# Patient Record
Sex: FEMALE | Race: WHITE | Employment: FULL TIME | URBAN - METROPOLITAN AREA
[De-identification: names, ages, dates, MRNs, and addresses within clinical notes are randomized per-mention and may not be internally consistent; named-entity substitution may affect disease eponyms.]

---

## 2017-06-10 PROBLEM — R71.8 MICROCYTOSIS: Status: ACTIVE | Noted: 2017-06-10

## 2017-06-10 PROBLEM — E87.1 HYPONATREMIA: Status: ACTIVE | Noted: 2017-06-10

## 2017-06-10 PROBLEM — I26.99 PULMONARY EMBOLI (HCC): Status: ACTIVE | Noted: 2017-06-10

## 2017-06-10 PROBLEM — F32.A DEPRESSION: Status: ACTIVE | Noted: 2017-06-10

## 2017-06-16 PROBLEM — Z79.01 LONG TERM CURRENT USE OF ANTICOAGULANT: Status: ACTIVE | Noted: 2017-06-16

## 2017-06-16 PROBLEM — I82.412 ACUTE DEEP VEIN THROMBOSIS (DVT) OF FEMORAL VEIN OF LEFT LOWER EXTREMITY (HCC): Status: ACTIVE | Noted: 2017-06-16

## 2017-06-26 PROBLEM — H53.121 TRANSIENT BLINDNESS OF RIGHT EYE: Status: ACTIVE | Noted: 2017-06-26

## 2017-06-26 PROBLEM — D64.9 ANEMIA: Status: ACTIVE | Noted: 2017-06-26

## 2017-06-27 PROBLEM — R00.1 BRADYCARDIA: Status: ACTIVE | Noted: 2017-06-27

## 2021-09-07 RX ORDER — VENLAFAXINE 100 MG/1
200 TABLET ORAL DAILY
Qty: 180 TABLET | Refills: 0 | Status: SHIPPED | OUTPATIENT
Start: 2021-09-07 | End: 2021-10-28 | Stop reason: SDUPTHER

## 2021-09-07 NOTE — TELEPHONE ENCOUNTER
Sent Effexor refill for 90 days, will need appointment with me to re-establish care before she rans out

## 2021-09-07 NOTE — TELEPHONE ENCOUNTER
Rx refill request for   Venlafaxine 100MG Tab  #90  Sig: Take 2 Tab by mouth in the morning and 1 at night    Pharmacy: [id number] [facility]

## 2021-09-29 ENCOUNTER — TELEPHONE (OUTPATIENT)
Dept: PRIMARY CARE CLINIC | Age: 66
End: 2021-09-29

## 2021-09-29 NOTE — TELEPHONE ENCOUNTER
----- Message from Rick Lai sent at 9/29/2021  1:37 PM EDT -----  Subject: Message to Provider    QUESTIONS  Information for Provider? Pt currently taking venlafaxine. RX is for 2 per   day, but PCP said to take 3 per day so she needs an updated. RX.   ---------------------------------------------------------------------------  --------------  Deannie Sandhoff INFO  What is the best way for the office to contact you? OK to leave message on   voicemail  Preferred Call Back Phone Number? [phone]  ---------------------------------------------------------------------------  --------------  SCRIPT ANSWERS  Relationship to Patient?  Self

## 2021-10-18 ENCOUNTER — TELEPHONE (OUTPATIENT)
Dept: PRIMARY CARE CLINIC | Age: 66
End: 2021-10-18

## 2021-10-18 NOTE — TELEPHONE ENCOUNTER
Patient had GI bleed in August, has current PCP, former patient of mine. Has not reestablish care with me. Need to check with her GI if she is safe to go back on Xarelto.

## 2021-10-19 NOTE — TELEPHONE ENCOUNTER
Spoke with the pharmacy and the pt. Pt aware she will need to establish as a pt first. Pt was on the schedule for 10/19/21. Pt was r/s for next Weds.

## 2021-10-27 ENCOUNTER — HOSPITAL ENCOUNTER (OUTPATIENT)
Age: 66
Discharge: HOME | End: 2021-10-27
Payer: COMMERCIAL

## 2021-10-27 ENCOUNTER — OFFICE VISIT (OUTPATIENT)
Dept: PRIMARY CARE CLINIC | Age: 66
End: 2021-10-27
Payer: COMMERCIAL

## 2021-10-27 VITALS
HEIGHT: 66 IN | OXYGEN SATURATION: 97 % | BODY MASS INDEX: 40.98 KG/M2 | HEART RATE: 82 BPM | RESPIRATION RATE: 16 BRPM | WEIGHT: 255 LBS | DIASTOLIC BLOOD PRESSURE: 60 MMHG | TEMPERATURE: 98.7 F | SYSTOLIC BLOOD PRESSURE: 138 MMHG

## 2021-10-27 DIAGNOSIS — D50.0 IRON DEFICIENCY ANEMIA DUE TO CHRONIC BLOOD LOSS: ICD-10-CM

## 2021-10-27 DIAGNOSIS — I10 ESSENTIAL HYPERTENSION, BENIGN: ICD-10-CM

## 2021-10-27 DIAGNOSIS — E78.2 MIXED HYPERLIPIDEMIA: ICD-10-CM

## 2021-10-27 DIAGNOSIS — F41.8 DEPRESSION WITH ANXIETY: ICD-10-CM

## 2021-10-27 DIAGNOSIS — Z23 FLU VACCINE NEED: ICD-10-CM

## 2021-10-27 DIAGNOSIS — I82.512 CHRONIC DEEP VEIN THROMBOSIS (DVT) OF FEMORAL VEIN OF LEFT LOWER EXTREMITY (HCC): ICD-10-CM

## 2021-10-27 DIAGNOSIS — I10 ESSENTIAL HYPERTENSION, BENIGN: Primary | ICD-10-CM

## 2021-10-27 LAB
A/G RATIO: 1.4 (ref 1.1–2.2)
ALBUMIN SERPL-MCNC: 4.1 G/DL (ref 3.4–5)
ALP BLD-CCNC: 100 U/L (ref 40–129)
ALT SERPL-CCNC: 14 U/L (ref 10–40)
ANION GAP SERPL CALCULATED.3IONS-SCNC: 10 MMOL/L (ref 3–16)
AST SERPL-CCNC: 30 U/L (ref 15–37)
BASOPHILS ABSOLUTE: 0.1 K/UL (ref 0–0.2)
BASOPHILS RELATIVE PERCENT: 0.9 %
BILIRUB SERPL-MCNC: <0.2 MG/DL (ref 0–1)
BUN BLDV-MCNC: 16 MG/DL (ref 7–20)
CALCIUM SERPL-MCNC: 9.1 MG/DL (ref 8.3–10.6)
CHLORIDE BLD-SCNC: 104 MMOL/L (ref 99–110)
CHOLESTEROL, TOTAL: 163 MG/DL (ref 0–199)
CO2: 25 MMOL/L (ref 21–32)
CREAT SERPL-MCNC: 0.7 MG/DL (ref 0.6–1.2)
EOSINOPHILS ABSOLUTE: 0.1 K/UL (ref 0–0.6)
EOSINOPHILS RELATIVE PERCENT: 1.9 %
FERRITIN: 61.3 NG/ML (ref 15–150)
GFR AFRICAN AMERICAN: >60
GFR NON-AFRICAN AMERICAN: >60
GLOBULIN: 3 G/DL
GLUCOSE BLD-MCNC: 109 MG/DL (ref 70–99)
HCT VFR BLD CALC: 33.1 % (ref 36–48)
HDLC SERPL-MCNC: 88 MG/DL (ref 40–60)
HEMOGLOBIN: 10.5 G/DL (ref 12–16)
LDL CHOLESTEROL CALCULATED: 58 MG/DL
LYMPHOCYTES ABSOLUTE: 1.2 K/UL (ref 1–5.1)
LYMPHOCYTES RELATIVE PERCENT: 20.5 %
MCH RBC QN AUTO: 25.7 PG (ref 26–34)
MCHC RBC AUTO-ENTMCNC: 31.8 G/DL (ref 31–36)
MCV RBC AUTO: 80.6 FL (ref 80–100)
MONOCYTES ABSOLUTE: 0.3 K/UL (ref 0–1.3)
MONOCYTES RELATIVE PERCENT: 5.6 %
NEUTROPHILS ABSOLUTE: 4.2 K/UL (ref 1.7–7.7)
NEUTROPHILS RELATIVE PERCENT: 71.1 %
PDW BLD-RTO: 26.9 % (ref 12.4–15.4)
PLATELET # BLD: 193 K/UL (ref 135–450)
PMV BLD AUTO: 8.6 FL (ref 5–10.5)
POTASSIUM SERPL-SCNC: 4.3 MMOL/L (ref 3.5–5.1)
RBC # BLD: 4.11 M/UL (ref 4–5.2)
SODIUM BLD-SCNC: 139 MMOL/L (ref 136–145)
T4 FREE: 0.8 NG/DL (ref 0.9–1.8)
TOTAL PROTEIN: 7.1 G/DL (ref 6.4–8.2)
TRIGL SERPL-MCNC: 85 MG/DL (ref 0–150)
TSH REFLEX: 2.17 UIU/ML (ref 0.27–4.2)
VLDLC SERPL CALC-MCNC: 17 MG/DL
WBC # BLD: 5.9 K/UL (ref 4–11)

## 2021-10-27 PROCEDURE — 1036F TOBACCO NON-USER: CPT | Performed by: FAMILY MEDICINE

## 2021-10-27 PROCEDURE — 80053 COMPREHEN METABOLIC PANEL: CPT

## 2021-10-27 PROCEDURE — 36415 COLL VENOUS BLD VENIPUNCTURE: CPT

## 2021-10-27 PROCEDURE — 83036 HEMOGLOBIN GLYCOSYLATED A1C: CPT

## 2021-10-27 PROCEDURE — 85025 COMPLETE CBC W/AUTO DIFF WBC: CPT

## 2021-10-27 PROCEDURE — G8417 CALC BMI ABV UP PARAM F/U: HCPCS | Performed by: FAMILY MEDICINE

## 2021-10-27 PROCEDURE — 84439 ASSAY OF FREE THYROXINE: CPT

## 2021-10-27 PROCEDURE — 82728 ASSAY OF FERRITIN: CPT

## 2021-10-27 PROCEDURE — G8484 FLU IMMUNIZE NO ADMIN: HCPCS | Performed by: FAMILY MEDICINE

## 2021-10-27 PROCEDURE — 3017F COLORECTAL CA SCREEN DOC REV: CPT | Performed by: FAMILY MEDICINE

## 2021-10-27 PROCEDURE — G8427 DOCREV CUR MEDS BY ELIG CLIN: HCPCS | Performed by: FAMILY MEDICINE

## 2021-10-27 PROCEDURE — 4040F PNEUMOC VAC/ADMIN/RCVD: CPT | Performed by: FAMILY MEDICINE

## 2021-10-27 PROCEDURE — G8400 PT W/DXA NO RESULTS DOC: HCPCS | Performed by: FAMILY MEDICINE

## 2021-10-27 PROCEDURE — 99214 OFFICE O/P EST MOD 30 MIN: CPT | Performed by: FAMILY MEDICINE

## 2021-10-27 PROCEDURE — 80061 LIPID PANEL: CPT

## 2021-10-27 PROCEDURE — 1123F ACP DISCUSS/DSCN MKR DOCD: CPT | Performed by: FAMILY MEDICINE

## 2021-10-27 PROCEDURE — 84443 ASSAY THYROID STIM HORMONE: CPT

## 2021-10-27 PROCEDURE — 1090F PRES/ABSN URINE INCON ASSESS: CPT | Performed by: FAMILY MEDICINE

## 2021-10-27 PROCEDURE — 90471 IMMUNIZATION ADMIN: CPT | Performed by: FAMILY MEDICINE

## 2021-10-27 PROCEDURE — 90694 VACC AIIV4 NO PRSRV 0.5ML IM: CPT | Performed by: FAMILY MEDICINE

## 2021-10-27 RX ORDER — ATORVASTATIN CALCIUM 40 MG/1
40 TABLET, FILM COATED ORAL NIGHTLY
Qty: 90 TABLET | Refills: 3 | Status: SHIPPED | OUTPATIENT
Start: 2021-10-27 | End: 2021-12-08 | Stop reason: SDUPTHER

## 2021-10-27 RX ORDER — LOSARTAN POTASSIUM 25 MG/1
25 TABLET ORAL NIGHTLY
Qty: 90 TABLET | Refills: 3 | Status: SHIPPED | OUTPATIENT
Start: 2021-10-27 | End: 2021-12-21 | Stop reason: SDUPTHER

## 2021-10-27 RX ORDER — LAMOTRIGINE 100 MG/1
100 TABLET ORAL 3 TIMES DAILY
Qty: 270 TABLET | Refills: 3 | Status: SHIPPED | OUTPATIENT
Start: 2021-10-27 | End: 2021-10-28 | Stop reason: SDUPTHER

## 2021-10-27 ASSESSMENT — PATIENT HEALTH QUESTIONNAIRE - PHQ9
2. FEELING DOWN, DEPRESSED OR HOPELESS: 0
SUM OF ALL RESPONSES TO PHQ QUESTIONS 1-9: 0

## 2021-10-27 ASSESSMENT — LIFESTYLE VARIABLES: HOW OFTEN DO YOU HAVE A DRINK CONTAINING ALCOHOL: 0

## 2021-10-27 NOTE — PROGRESS NOTES
Chief Complaint   Patient presents with    Hypertension     med refills             PROGRESS NOTE  Date of Service:  10/27/2021    Chief Complaint   Patient presents with    Hypertension     med refills        SUBJECTIVE:   Fernando Carpenter is a 72 y.o. female here to reestablish care with me. Patient is being treated for hypertension, hyperlipidemia, diet-controlled diabetes mellitus, depression with anxiety, chronic DVT on Xarelto. Compliant with current medications and requesting refill. Patient is happy to tell me that she quit smoking August 2021. Patient had 50-pack-year. Patient was hospitalized due severe anemia on August 5, 2021 causing generalized weakness, shortness of breath and syncopal episode. EGD and colonoscopy were unremarkable except for small esophageal ulcer but according to the GI it was not enough to cause the anemia. She is supposed to follow-up with a GI for possible capsule endoscopy. Goes to Dr. Adenike Birmingham, hematology/oncology, for her anemia. Started IV iron infusion once a week and most recent IV infusion was 2 months ago. Has another follow-up appointment in Dec.     Cardiac cath on 8/10/2021 by Dr. Zachary Cooper: Mid LAD 40% calcified stenosis, mid RCA 20 to 30% stenosis, distal RCA 50% stenosis, nonobstructive CAD, recommended medical management. CTA of abdominal aorta on 7/27/2021: Patent abdominal aorta with mild to moderate atherosclerotic calcification. No evidence of aortic dissection. Significant plaque noted in the distal right external iliac artery. Significantly limited evaluation of bilateral lower extremity in the just distal to bilateral knees.     CTA of head and neck on 6/25/2021: Proximal right ICA plaque causing 30 to 40% stenosis, proximal left ICA plaque causing 40 to 50% stenosis    SANYA on both lower extremities done on 5/26/2021: No evidence of arterial insufficiency involving the bilateral lower extremities at rest.    Had Covid infection on 3/8/2021, received convalescent plasma. Patient is  4 para 3, status post hysterectomy  Last mammogram done on 2020  DEXA scan done on 2020: Bone density in the lumbar spine and hips are within normal limits. Past Medical History:   Diagnosis Date    Arthritis     knees    COVID-19 virus infection 2021    Received convalescent plasma    Deep vein thrombosis of left femoral vein (HCC) 06/10/2017    Started Xarelto    Depression     with anxiety. Stable on Lamictal and Effexor    Diabetes mellitus ([facility] 2019    Hemoglobin A1c 6.6%, diet controlled    GI bleed 2021    hemoglobin 6.0. Received 2 units of packed RBC.  High cholesterol     History of chickenpox     History of DVT (deep vein thrombosis)     Hypertension 2020    Started lisinopril    Obesity     Pulmonary embolism ([facility] [address] 06/10/2017    Sleep apnea, unspecified     Sudden visual loss of left eye 2017    ESR 77, possible temporal arteritis but normal temporal artery biopsy      Past Surgical History:   Procedure Laterality Date    CARDIAC CATHETERIZATION  08/10/2021    Nonobstructive CAD, mid LAD 40%, mid RCA 20 to 30% and distal RCA 50%. Recommended medical management.   Performed by Dr. Alma Jacinto COLONOSCOPY  2013    Done by Dr. Jonathan Gomez recommended 10-year follow-up    COLONOSCOPY  2021    Due to GI bleed, done by Dr. Cecilio Ramos, TOTAL ABDOMINAL      OTHER SURGICAL HISTORY  2017    temporal artery bx    OVARY REMOVAL Bilateral     Age 48    UPPER GASTROINTESTINAL ENDOSCOPY  2021    Esophageal ulcer with esophageal stricture done by Dr. Aristeo Hdz History     Tobacco Use    Smoking status: Former Smoker     Packs/day: 1.00     Years: 50.00     Pack years: 50.00     Types: Cigarettes     Quit date: 2021     Years since quittin.2    Smokeless tobacco: Never Used Substance Use Topics    Alcohol use: No      Family History   Problem Relation Age of Onset    Lupus Mother     Ovarian Cancer Mother     Clotting Disorder Father         hx blood clot     Current Outpatient Medications   Medication Sig Dispense Refill    furosemide (LASIX) 40 MG tablet Take 40 mg by mouth      pantoprazole (PROTONIX) 40 MG tablet Take 40 mg by mouth      ferrous sulfate 134 MG TABS Take 325 mg by mouth      rivaroxaban (XARELTO) 20 MG TABS tablet Take 1 tablet by mouth daily (with breakfast) 90 tablet 3    losartan (COZAAR) 25 MG tablet Take 1 tablet by mouth nightly 90 tablet 3    lamoTRIgine (LAMICTAL) 100 MG tablet Take 1 tablet by mouth 3 times daily 270 tablet 3    atorvastatin (LIPITOR) 40 MG tablet Take 1 tablet by mouth nightly 90 tablet 3    venlafaxine (EFFEXOR) 100 MG tablet Take 2 tablets by mouth daily (Patient taking differently: Take 200 mg by mouth 3 times daily ) 180 tablet 0     No current facility-administered medications for this visit.        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- Hep Associated Ab = 0.744 (ref 0.399), PIFA Hep/PF4: Positive per hospital records        Review of Systems    OBJECTIVE:  /60 (Site: Left Upper Arm, Position: Sitting, Cuff Size: Large Adult)   Pulse 82   Temp 98.7 °F (37.1 °C)   Resp 16   Ht 5' 6\" (1.676 m)   Wt 255 lb (115.7 kg)   SpO2 97%   BMI 41.16 kg/m²    Physical Exam  General:   alert, appears stated age and cooperative   Gait:   normal   Skin:   normal   Oral cavity:   lips, mucosa, and tongue normal; teeth and gums normal   Eyes:   sclerae white, pupils equal and reactive, red reflex normal bilaterally   Ears:   normal bilaterally   Neck:   no adenopathy, no carotid bruit, no JVD, supple, symmetrical, trachea midline and thyroid not enlarged, symmetric, no tenderness/mass/nodules   Lungs:  clear to auscultation bilaterally   Heart:   regular rate and rhythm, S1, S2 normal, no murmur, click, rub or gallop   Extremities:   Good range of motion, good pulses and no edema   Neuro:  normal without focal findings, mental status, speech normal, alert and oriented x3, BAM and reflexes normal and symmetric     ASSESSMENT:  1. Essential hypertension, benign    2. Mixed hyperlipidemia    3. Iron deficiency anemia due to chronic blood loss    4. Flu vaccine need    5. Chronic deep vein thrombosis (DVT) of femoral vein of left lower extremity (HCC)    6. Depression with anxiety         PLAN:   1. Essential hypertension, benign  Blood pressure stable at 138/60. Continue losartan 25 mg daily. Goal of blood pressure to keep it under 130/80.  - losartan (COZAAR) 25 MG tablet; Take 1 tablet by mouth nightly  Dispense: 90 tablet; Refill: 3  - TSH with Reflex; Future  - T4, Free; Future    2. Mixed hyperlipidemia  On Lipitor 40 mg at bedtime. Patient has history of nonobstructive CAD, recommended LDL goal is less than 80.  - atorvastatin (LIPITOR) 40 MG tablet; Take 1 tablet by mouth nightly  Dispense: 90 tablet; Refill: 3  - Lipid Panel; Future  - Comprehensive Metabolic Panel; Future  - Hemoglobin A1C; Future  - TSH with Reflex; Future  - T4, Free; Future    3. Iron deficiency anemia due to chronic blood loss  Continue ferrous sulfate and keep appoint with Dr. Rosenda Thomas.  - CBC Auto Differential; Future  - Ferritin; Future  - TSH with Reflex; Future  - T4, Free; Future    4. Flu vaccine need  VIS given. No history of immunization reaction  - INFLUENZA, QUADV, ADJUVANTED, 65 YRS =, IM, PF, PREFILL SYR, 0.5ML (FLUAD)    5. Chronic deep vein thrombosis (DVT) of femoral vein of left lower extremity (HCC)  On Xarelto 20 mg daily for anticoagulation. It was resumed after she had GI bleed. She is aware that this puts her at risk for another GI bleed. - rivaroxaban (XARELTO) 20 MG TABS tablet;  Take 1 tablet by mouth daily (with breakfast)  Dispense: 90 tablet; Refill: 3    6. Depression with anxiety  Stable on Lamictal 100 mg 3 times a day and Effexor 200 mg 3 times a day. - lamoTRIgine (LAMICTAL) 100 MG tablet; Take 1 tablet by mouth 3 times daily  Dispense: 270 tablet; Refill: 3    Medications reviewed and updated. Drawing centers provided for the blood test and will call with test results. Continue to follow-up with her heme-onc, cardiologist and podiatrist.  Will need to make another appointment with her GI for the capsule endoscopy. Return in about 6 weeks (around 12/7/2021) for AWV. Electronically signed by Nadira Martinez MD on 10/27/21 at 8:54 PM.     This dictation was generated by voice recognition computer software. Although all attempts are made to edit the dictation for accuracy, there may be errors in the transcription that are not intended.

## 2021-10-28 ENCOUNTER — TELEPHONE (OUTPATIENT)
Dept: PRIMARY CARE CLINIC | Age: 66
End: 2021-10-28

## 2021-10-28 DIAGNOSIS — F41.8 DEPRESSION WITH ANXIETY: ICD-10-CM

## 2021-10-28 LAB
ESTIMATED AVERAGE GLUCOSE: 96.8 MG/DL
HBA1C MFR BLD: 5 %

## 2021-10-28 RX ORDER — LAMOTRIGINE 200 MG/1
200 TABLET ORAL DAILY
Qty: 90 TABLET | Refills: 3 | Status: SHIPPED | OUTPATIENT
Start: 2021-10-28 | End: 2022-09-26 | Stop reason: SDUPTHER

## 2021-10-28 RX ORDER — VENLAFAXINE 100 MG/1
100 TABLET ORAL 3 TIMES DAILY
Qty: 270 TABLET | Refills: 3 | Status: SHIPPED | OUTPATIENT
Start: 2021-10-28 | End: 2022-09-26 | Stop reason: SDUPTHER

## 2021-10-28 NOTE — TELEPHONE ENCOUNTER
Pt asked for corrected Rxs to be sent to [facility]     Venlafaxine 100MG Tab Take one tablet 3 times daily    Lamotrigine 200MG Tab Take one 200MG tablet daily    Pt states qty and sig Venlafaxine sent to pharmacy on 9/7/21 were incorrect, pt takes 3 tablets daily   She also said qty and sig were incorrect for Lamotrigine sent on 10/27/21, she takes 200MG tab once daily

## 2021-10-29 NOTE — TELEPHONE ENCOUNTER
Patient is notified that medications have been sent to the pharmacy with the correct quantity and directions.

## 2021-12-08 ENCOUNTER — TELEPHONE (OUTPATIENT)
Dept: PRIMARY CARE CLINIC | Age: 66
End: 2021-12-08

## 2021-12-08 DIAGNOSIS — E78.2 MIXED HYPERLIPIDEMIA: ICD-10-CM

## 2021-12-08 RX ORDER — ATORVASTATIN CALCIUM 40 MG/1
40 TABLET, FILM COATED ORAL NIGHTLY
Qty: 90 TABLET | Refills: 3 | Status: SHIPPED | OUTPATIENT
Start: 2021-12-08 | End: 2022-09-26 | Stop reason: SDUPTHER

## 2021-12-08 NOTE — TELEPHONE ENCOUNTER
----- Message from Jose Luisgilmer Phalen sent at 12/8/2021  9:02 AM EST -----  Subject: Refill Request    QUESTIONS  Name of Medication? atorvastatin (LIPITOR) 40 MG tablet  Patient-reported dosage and instructions? 40 mg 1 tablet by mouth nightly  How many days do you have left? 0  Preferred Pharmacy? [facility]  Pharmacy phone number (if available)? [phone]  Additional Information for Provider? patient is out of medication, stated   that pharmacy was supposed contact office to refill, patient would like   confirmation of when sent to pharmacy to .   ---------------------------------------------------------------------------  --------------  [address]  What is the best way for the office to contact you? OK to leave message on   voicemail  Preferred Call Back Phone Number?  [phone]

## 2021-12-13 ENCOUNTER — TELEPHONE (OUTPATIENT)
Dept: PRIMARY CARE CLINIC | Age: 66
End: 2021-12-13

## 2021-12-13 DIAGNOSIS — Z20.822 EXPOSURE TO CONFIRMED CASE OF COVID-19: ICD-10-CM

## 2021-12-13 DIAGNOSIS — Z20.822 EXPOSURE TO CONFIRMED CASE OF COVID-19: Primary | ICD-10-CM

## 2021-12-13 NOTE — TELEPHONE ENCOUNTER
Pt called said she has been exposed to covid-19 needs a script for a rapid covid test also wants to know if negative if she can see been seen for her appt 12/14/21     Fax # [phone]  [facility]

## 2021-12-14 ENCOUNTER — OFFICE VISIT (OUTPATIENT)
Dept: PRIMARY CARE CLINIC | Age: 66
End: 2021-12-14
Payer: COMMERCIAL

## 2021-12-14 VITALS
TEMPERATURE: 97.3 F | SYSTOLIC BLOOD PRESSURE: 168 MMHG | DIASTOLIC BLOOD PRESSURE: 86 MMHG | HEIGHT: 66 IN | WEIGHT: 257.6 LBS | HEART RATE: 90 BPM | BODY MASS INDEX: 41.4 KG/M2 | OXYGEN SATURATION: 99 %

## 2021-12-14 DIAGNOSIS — D50.0 IRON DEFICIENCY ANEMIA DUE TO CHRONIC BLOOD LOSS: ICD-10-CM

## 2021-12-14 DIAGNOSIS — Z23 NEED FOR TDAP VACCINATION: ICD-10-CM

## 2021-12-14 DIAGNOSIS — I10 ESSENTIAL HYPERTENSION, BENIGN: ICD-10-CM

## 2021-12-14 DIAGNOSIS — Z00.00 MEDICARE ANNUAL WELLNESS VISIT, SUBSEQUENT: Primary | ICD-10-CM

## 2021-12-14 DIAGNOSIS — J20.9 ACUTE BRONCHITIS, UNSPECIFIED ORGANISM: ICD-10-CM

## 2021-12-14 PROCEDURE — 1123F ACP DISCUSS/DSCN MKR DOCD: CPT | Performed by: FAMILY MEDICINE

## 2021-12-14 PROCEDURE — 4040F PNEUMOC VAC/ADMIN/RCVD: CPT | Performed by: FAMILY MEDICINE

## 2021-12-14 PROCEDURE — 3017F COLORECTAL CA SCREEN DOC REV: CPT | Performed by: FAMILY MEDICINE

## 2021-12-14 PROCEDURE — 90715 TDAP VACCINE 7 YRS/> IM: CPT | Performed by: FAMILY MEDICINE

## 2021-12-14 PROCEDURE — G8484 FLU IMMUNIZE NO ADMIN: HCPCS | Performed by: FAMILY MEDICINE

## 2021-12-14 PROCEDURE — G0439 PPPS, SUBSEQ VISIT: HCPCS | Performed by: FAMILY MEDICINE

## 2021-12-14 PROCEDURE — 90471 IMMUNIZATION ADMIN: CPT | Performed by: FAMILY MEDICINE

## 2021-12-14 RX ORDER — METHYLPREDNISOLONE 4 MG/1
TABLET ORAL
Qty: 1 KIT | Refills: 0 | Status: SHIPPED | OUTPATIENT
Start: 2021-12-14 | End: 2021-12-28 | Stop reason: ALTCHOICE

## 2021-12-14 RX ORDER — DOXYCYCLINE HYCLATE 100 MG
100 TABLET ORAL 2 TIMES DAILY
Qty: 14 TABLET | Refills: 0 | Status: SHIPPED | OUTPATIENT
Start: 2021-12-14 | End: 2021-12-21

## 2021-12-14 ASSESSMENT — PATIENT HEALTH QUESTIONNAIRE - PHQ9
SUM OF ALL RESPONSES TO PHQ QUESTIONS 1-9: 0
SUM OF ALL RESPONSES TO PHQ9 QUESTIONS 1 & 2: 0
SUM OF ALL RESPONSES TO PHQ9 QUESTIONS 1 & 2: 0
SUM OF ALL RESPONSES TO PHQ QUESTIONS 1-9: 0
SUM OF ALL RESPONSES TO PHQ QUESTIONS 1-9: 0
1. LITTLE INTEREST OR PLEASURE IN DOING THINGS: 0
SUM OF ALL RESPONSES TO PHQ QUESTIONS 1-9: 0
2. FEELING DOWN, DEPRESSED OR HOPELESS: 0
2. FEELING DOWN, DEPRESSED OR HOPELESS: 0
SUM OF ALL RESPONSES TO PHQ QUESTIONS 1-9: 0
SUM OF ALL RESPONSES TO PHQ QUESTIONS 1-9: 0
1. LITTLE INTEREST OR PLEASURE IN DOING THINGS: 0

## 2021-12-14 ASSESSMENT — LIFESTYLE VARIABLES
AUDIT-C TOTAL SCORE: INCOMPLETE
HOW OFTEN DO YOU HAVE A DRINK CONTAINING ALCOHOL: 0
HOW OFTEN DO YOU HAVE A DRINK CONTAINING ALCOHOL: 0
HOW OFTEN DO YOU HAVE A DRINK CONTAINING ALCOHOL: NEVER
AUDIT TOTAL SCORE: INCOMPLETE

## 2021-12-14 NOTE — PATIENT INSTRUCTIONS
Personalized Preventive Plan for Anibal Rockfield - 12/14/2021  Medicare offers a range of preventive health benefits. Some of the tests and screenings are paid in full while other may be subject to a deductible, co-insurance, and/or copay. Some of these benefits include a comprehensive review of your medical history including lifestyle, illnesses that may run in your family, and various assessments and screenings as appropriate. After reviewing your medical record and screening and assessments performed today your provider may have ordered immunizations, labs, imaging, and/or referrals for you. A list of these orders (if applicable) as well as your Preventive Care list are included within your After Visit Summary for your review. Other Preventive Recommendations:    · A preventive eye exam performed by an eye specialist is recommended every 1-2 years to screen for glaucoma; cataracts, macular degeneration, and other eye disorders. · A preventive dental visit is recommended every 6 months. · Try to get at least 150 minutes of exercise per week or 10,000 steps per day on a pedometer . · Order or download the FREE \"Exercise & Physical Activity: Your Everyday Guide\" from [employer]. Call 8-457.360.7356 or search [employer] online. · You need 6521-5291 mg of calcium and 8832-9669 IU of vitamin D per day. It is possible to meet your calcium requirement with diet alone, but a vitamin D supplement is usually necessary to meet this goal.  · When exposed to the sun, use a sunscreen that protects against both UVA and UVB radiation with an SPF of 30 or greater. Reapply every 2 to 3 hours or after sweating, drying off with a towel, or swimming. · Always wear a seat belt when traveling in a car. Always wear a helmet when riding a bicycle or motorcycle.

## 2021-12-14 NOTE — PROGRESS NOTES
Chief Complaint   Patient presents with   [facility] AWV     Medicare Annual Wellness Visit  Name: Yg Gallardo Date: 2021   MRN: [id number] Sex: Female   Age: 77 y.o. Ethnicity: Non- / Non    : 1955 Race: White (non-)      Gareth Husbands is here for Medicare AWV     Tinea 72-year-old female here for Medicare annual wellness visit. Compliant with current medications. Patient reported to me that she started smoking again a month ago after 3 months off smoking. Back to 1 pack a day. She will work on quitting again. Patient denies any chest pains or shortness of breath. No melena or hematochezia. Complaint of cough and chest congestion for 5 days. No fever or chills. No Covid infection exposure. Reviewed blood test done on 2021: Hemoglobin A1c 5.0%, TSH 2.17, free T4 0.8, ferritin 61.3, sodium 139, potassium 4.3, glucose 109, creatinine 0.7, calcium 9.1, ALT 14, AST 30, total cholesterol 163, triglyceride 85, HDL 88, LDL 58, hemoglobin 10.5, hematocrit 33.1, WBC 5.9. Seeing her cardiologist, Dr. Pamela King, on 2021. Recommend to get CTA chest in 1 year to follow-up her ascending thoracic aneurysm. Encouraged to wear compression stockings for her chronic DVT. CTA of the neck showed less than 50% stenosis in bilateral common and ICA. Has an appointment with Dr. Susie Perales, cardiologist on 2022. In future appointments with Dr. Pamela King on 2022 and 2022. Screenings for behavioral, psychosocial and functional/safety risks, and cognitive dysfunction are all negative except as indicated below. These results, as well as other patient data from the [address] form, are documented in Flowsheets linked to this Encounter.     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 1 kit 0    atorvastatin (LIPITOR) 40 MG tablet Take 1 tablet by mouth nightly 90 tablet 3    venlafaxine (EFFEXOR) 100 MG tablet Take 1 tablet by mouth 3 times daily 270 tablet 3    lamoTRIgine (LAMICTAL) 200 MG tablet Take 1 tablet by mouth daily (Patient taking differently: Take 200 mg by mouth 2 times daily ) 90 tablet 3    furosemide (LASIX) 40 MG tablet Take 40 mg by mouth daily       pantoprazole (PROTONIX) 40 MG tablet Take 40 mg by mouth daily       ferrous sulfate 134 MG TABS Take 325 mg by mouth daily       rivaroxaban (XARELTO) 20 MG TABS tablet Take 1 tablet by mouth daily (with breakfast) 90 tablet 3    losartan (COZAAR) 25 MG tablet Take 1 tablet by mouth nightly (Patient taking differently: Take 50 mg by mouth 2 times daily ) 90 tablet 3     No current facility-administered medications for this visit. Past Medical History:   Diagnosis Date    Arthritis     knees    COVID-19 virus infection 03/08/2021    Received convalescent plasma    Deep vein thrombosis of left femoral vein (HCC) 06/10/2017    Started Xarelto    Depression 2003    with anxiety. Stable on Lamictal and Effexor    Diabetes mellitus ([facility] [address] 11/06/2019    Hemoglobin A1c 6.6%, diet controlled    GI bleed 05/20/2021    hemoglobin 6.0. Received 2 units of packed RBC.     High cholesterol     History of chickenpox     History of DVT (deep vein thrombosis) 2012    Hypertension 09/23/2020    Started lisinopril    Obesity     Pulmonary embolism ([facility] [address] 06/10/2017    Sleep apnea, unspecified     Sudden visual loss of left eye 06/26/2017    ESR 77, possible temporal arteritis but normal temporal artery biopsy       Past reviewed and are found in [address] [facility]. The following problems were reviewed today and where indicated follow up appointments were made and/or referrals ordered. Positive Risk Factor Screenings with Interventions:            General Health and ACP:  General  In general, how would you say your health is?: Good  In the past 7 days, have you experienced any of the following? New or Increased Pain, New or Increased Fatigue, Loneliness, Social Isolation, Stress or Anger?: None of These  Do you get the social and emotional support that you need?: Yes  Do you have a Living Will?: (!) No  Advance Directives     Power of [address] Will ACP-Advance Directive ACP-Power of     Not on File Not on File Not on File Not on File      General Health Risk Interventions:  · has adequate emotional and social support from family. Health Habits/Nutrition:  Health Habits/Nutrition  Do you exercise for at least 20 minutes 2-3 times per week?: Yes  Have you lost any weight without trying in the past 3 months?: No  Do you eat only one meal per day?: (!) Yes  Have you seen the dentist within the past year?: (!) No  Body mass index: (!) 42.08  Health Habits/Nutrition Interventions:  · has not exercising and she needs to watch her diet. Encouraged to quit smoking again.     Hearing/Vision:  No exam data present  Hearing/Vision  Do you or your family notice any trouble with your hearing that hasn't been managed with hearing aids?: No  Do you have difficulty driving, watching TV, or doing any of your daily activities because of your eyesight?: No  Have you had an eye exam within the past year?: (!) No   Safety Interventions:  · Home safety tips provided       Personalized Preventive Plan   Current Health Maintenance Status  Immunization History   Administered Date(s) Administered    COVID-19, Duarte Peter, PF, 30mcg/0.3mL 05/29/2021, 06/14/2021    Influenza Virus Vaccine 10/08/2015, 11/01/2018, 11/06/2019, 09/23/2020    Influenza, MDCK Quadv, IM, PF (Flucelvax 2 yrs and older) 10/11/2017    Influenza, Quadv, adjuvanted, 65 yrs +, IM, PF (Fluad) 10/27/2021    Pneumococcal Polysaccharide ([id number]) 12/03/2020    Tdap (Boostrix, Adacel) 12/14/2021        Health Maintenance   Topic Date Due    COVID-19 Vaccine (3 - Booster) 12/14/2021    Shingles Vaccine (1 of 2) 12/14/2022 (Originally 11/11/2005)    Low dose CT lung screening  03/08/2022    Lipid screen  10/27/2022    Potassium monitoring  10/27/2022    Creatinine monitoring  10/27/2022    Breast cancer screen  12/21/2022    Colon cancer screen colonoscopy  08/09/2024    Diabetes screen  10/27/2024    DTaP/Tdap/Td vaccine (2 - Td or Tdap) 12/14/2031    DEXA (modify frequency per FRAX score)  Completed    Flu vaccine  Completed    Pneumococcal 65+ years Vaccine  Completed    Hepatitis C screen  Completed    Hepatitis A vaccine  Aged Out    Hepatitis B vaccine  Aged Out    Hib vaccine  Aged Out    Meningococcal (ACWY) vaccine  Aged Out     Recommendations for [facility] Due: see orders and patient instructions/AVS.    1. Medicare annual wellness visit, subsequent  Reminded she needs COVID-19 booster. Declined shingles vaccine. Had normal DEXA scan and mammogram on 12/18/2020. Colonoscopy on 8/9/2021 as a work-up for GI bleed, polyps removed. Recommended 3-year follow-up. - CBC Auto Differential; Future    2. Acute bronchitis, unspecified organism/COPD exacerbation  Encouraged to quit smoking. Sent doxycycline 100 mg twice a day for 7 days and Medrol Dosepak. Continue to monitor symptoms. Call if not better in 3 to 5 days. - doxycycline hyclate (VIBRA-TABS) 100 MG tablet; Take 1 tablet by mouth 2 times daily for 7 days  Dispense: 14 tablet; Refill: 0  - methylPREDNISolone (MEDROL DOSEPACK) 4 MG tablet; Take by mouth as directed. Dispense: 1 kit; Refill: 0    3. Essential hypertension, benign  Blood pressure elevated 168/86.   Increase losartan to 50 mg twice a day. Watch salt intake. Follow-up in a week for blood pressure check. 4. Iron deficiency anemia due to chronic blood loss  Check level. Continue ferrous sulfate daily for now. - CBC Auto Differential; Future    5. Need for Tdap vaccination  VIS given. No history of immunization reaction.  - Tdap (age 6y and older) IM (Boostrix)    6. Chronic DVT  Continue Xarelto 20 g daily for anticoagulation. Encouraged to wear compression stockings. Lasix 40 mg daily as needed for leg edema. 7.  GERD  Continue Protonix 40 mg daily    8. Depression anxiety  Stable on Lamictal 200 mg daily and Effexor 100 mg 3 times a day. Return in about 1 week (around 12/21/2021) for BP check.     Recommended screening schedule for the next 5-10 years is provided to the patient in written form: see Patient Instructions/AVS.    Electronically signed by Berenice Dunn MD on 12/14/2021 at 8:16 PM.

## 2021-12-15 ENCOUNTER — HOSPITAL ENCOUNTER (OUTPATIENT)
Age: 66
Discharge: HOME | End: 2021-12-15
Payer: COMMERCIAL

## 2021-12-15 DIAGNOSIS — D50.0 IRON DEFICIENCY ANEMIA DUE TO CHRONIC BLOOD LOSS: ICD-10-CM

## 2021-12-15 DIAGNOSIS — Z00.00 MEDICARE ANNUAL WELLNESS VISIT, SUBSEQUENT: ICD-10-CM

## 2021-12-15 LAB
BASOPHILS ABSOLUTE: 0 K/UL (ref 0–0.2)
BASOPHILS RELATIVE PERCENT: 0.5 %
EOSINOPHILS ABSOLUTE: 0 K/UL (ref 0–0.6)
EOSINOPHILS RELATIVE PERCENT: 0.1 %
HCT VFR BLD CALC: 40.5 % (ref 36–48)
HEMOGLOBIN: 12.7 G/DL (ref 12–16)
LYMPHOCYTES ABSOLUTE: 0.9 K/UL (ref 1–5.1)
LYMPHOCYTES RELATIVE PERCENT: 27.4 %
MCH RBC QN AUTO: 27.4 PG (ref 26–34)
MCHC RBC AUTO-ENTMCNC: 31.5 G/DL (ref 31–36)
MCV RBC AUTO: 87 FL (ref 80–100)
MONOCYTES ABSOLUTE: 0.3 K/UL (ref 0–1.3)
MONOCYTES RELATIVE PERCENT: 8.9 %
NEUTROPHILS ABSOLUTE: 2 K/UL (ref 1.7–7.7)
NEUTROPHILS RELATIVE PERCENT: 63.1 %
PDW BLD-RTO: 17.4 % (ref 12.4–15.4)
PLATELET # BLD: 157 K/UL (ref 135–450)
PMV BLD AUTO: 8.1 FL (ref 5–10.5)
RBC # BLD: 4.66 M/UL (ref 4–5.2)
WBC # BLD: 3.1 K/UL (ref 4–11)

## 2021-12-15 PROCEDURE — 36415 COLL VENOUS BLD VENIPUNCTURE: CPT

## 2021-12-15 PROCEDURE — 85025 COMPLETE CBC W/AUTO DIFF WBC: CPT

## 2021-12-21 ENCOUNTER — OFFICE VISIT (OUTPATIENT)
Dept: PRIMARY CARE CLINIC | Age: 66
End: 2021-12-21

## 2021-12-21 VITALS — DIASTOLIC BLOOD PRESSURE: 70 MMHG | SYSTOLIC BLOOD PRESSURE: 160 MMHG

## 2021-12-21 DIAGNOSIS — I10 ESSENTIAL HYPERTENSION, BENIGN: ICD-10-CM

## 2021-12-21 DIAGNOSIS — I10 ESSENTIAL HYPERTENSION, BENIGN: Primary | ICD-10-CM

## 2021-12-21 PROCEDURE — 99999 PR OFFICE/OUTPT VISIT,PROCEDURE ONLY: CPT | Performed by: FAMILY MEDICINE

## 2021-12-21 RX ORDER — LOSARTAN POTASSIUM 50 MG/1
50 TABLET ORAL 2 TIMES DAILY
Qty: 180 TABLET | Refills: 3 | Status: SHIPPED | OUTPATIENT
Start: 2021-12-21 | End: 2022-02-09 | Stop reason: SDUPTHER

## 2021-12-28 ENCOUNTER — NURSE ONLY (OUTPATIENT)
Dept: PRIMARY CARE CLINIC | Age: 66
End: 2021-12-28

## 2021-12-28 VITALS — BODY MASS INDEX: 41.99 KG/M2 | SYSTOLIC BLOOD PRESSURE: 150 MMHG | DIASTOLIC BLOOD PRESSURE: 78 MMHG | WEIGHT: 257 LBS

## 2022-02-09 ENCOUNTER — OFFICE VISIT (OUTPATIENT)
Dept: PRIMARY CARE CLINIC | Age: 67
End: 2022-02-09
Payer: COMMERCIAL

## 2022-02-09 VITALS
SYSTOLIC BLOOD PRESSURE: 158 MMHG | BODY MASS INDEX: 43.52 KG/M2 | DIASTOLIC BLOOD PRESSURE: 68 MMHG | HEART RATE: 61 BPM | WEIGHT: 266.4 LBS | OXYGEN SATURATION: 98 %

## 2022-02-09 DIAGNOSIS — G89.29 CHRONIC MIDLINE LOW BACK PAIN WITHOUT SCIATICA: ICD-10-CM

## 2022-02-09 DIAGNOSIS — M54.50 CHRONIC MIDLINE LOW BACK PAIN WITHOUT SCIATICA: ICD-10-CM

## 2022-02-09 DIAGNOSIS — I10 ESSENTIAL HYPERTENSION, BENIGN: Primary | ICD-10-CM

## 2022-02-09 PROCEDURE — 4040F PNEUMOC VAC/ADMIN/RCVD: CPT | Performed by: FAMILY MEDICINE

## 2022-02-09 PROCEDURE — 3017F COLORECTAL CA SCREEN DOC REV: CPT | Performed by: FAMILY MEDICINE

## 2022-02-09 PROCEDURE — 4004F PT TOBACCO SCREEN RCVD TLK: CPT | Performed by: FAMILY MEDICINE

## 2022-02-09 PROCEDURE — G8417 CALC BMI ABV UP PARAM F/U: HCPCS | Performed by: FAMILY MEDICINE

## 2022-02-09 PROCEDURE — 1090F PRES/ABSN URINE INCON ASSESS: CPT | Performed by: FAMILY MEDICINE

## 2022-02-09 PROCEDURE — G9899 SCRN MAM PERF RSLTS DOC: HCPCS | Performed by: FAMILY MEDICINE

## 2022-02-09 PROCEDURE — G8427 DOCREV CUR MEDS BY ELIG CLIN: HCPCS | Performed by: FAMILY MEDICINE

## 2022-02-09 PROCEDURE — G8399 PT W/DXA RESULTS DOCUMENT: HCPCS | Performed by: FAMILY MEDICINE

## 2022-02-09 PROCEDURE — G8484 FLU IMMUNIZE NO ADMIN: HCPCS | Performed by: FAMILY MEDICINE

## 2022-02-09 PROCEDURE — 99213 OFFICE O/P EST LOW 20 MIN: CPT | Performed by: FAMILY MEDICINE

## 2022-02-09 PROCEDURE — 1123F ACP DISCUSS/DSCN MKR DOCD: CPT | Performed by: FAMILY MEDICINE

## 2022-02-09 RX ORDER — LOSARTAN POTASSIUM 50 MG/1
50 TABLET ORAL 2 TIMES DAILY
COMMUNITY
Start: 2022-01-02 | End: 2022-02-22 | Stop reason: SDUPTHER

## 2022-02-09 NOTE — PROGRESS NOTES
PROGRESS NOTE  Date of Service:  2/9/2022    Chief Complaint   Patient presents with    Hypertension     Follow up    Back Pain     For about 6 months       SUBJECTIVE:  Ced Ch is a 77 y.o. female presents for 6 week hypertension follow-up. Tolerating increased losartan to 50 mg twice a day. Denies any chest pains or shortness of breath. Been complaining of low back pain that sometimes goes down to the right butt cheek off and on for the past 6 months. No history of trauma. No bladder or bowel incontinence. Emotionally doing okay. Current Outpatient Medications   Medication Sig Dispense Refill    losartan (COZAAR) 50 MG tablet Take 50 mg by mouth 2 times daily       omeprazole (PRILOSEC) 20 MG delayed release capsule       atorvastatin (LIPITOR) 40 MG tablet Take 1 tablet by mouth nightly 90 tablet 3    venlafaxine (EFFEXOR) 100 MG tablet Take 1 tablet by mouth 3 times daily 270 tablet 3    lamoTRIgine (LAMICTAL) 200 MG tablet Take 1 tablet by mouth daily 90 tablet 3    furosemide (LASIX) 40 MG tablet Take 40 mg by mouth daily       pantoprazole (PROTONIX) 40 MG tablet Take 40 mg by mouth daily       ferrous sulfate 134 MG TABS Take 325 mg by mouth daily       rivaroxaban (XARELTO) 20 MG TABS tablet Take 1 tablet by mouth daily (with breakfast) 90 tablet 3     No current facility-administered medications for this visit. Patient's medications, allergies, past medical, surgical, social and family histories were reviewed and updated as appropriate.      Review of Systems    OBJECTIVE:  BP (!) 158/68 (Site: Right Upper Arm, Position: Sitting, Cuff Size: Large Adult)   Pulse 61   Wt 266 lb 6.4 oz (120.8 kg)   SpO2 98%   BMI 43.52 kg/m²    Physical Exam  General Appearance: Alert, cooperative, no distress, appears stated age   [de-identified]: unremarkable  Back: Symmetric, no curvature, ROM normal, (+) paralumbar and right SI area  Lungs: Clear to auscultation bilaterally, respirations unlabored   Heart: Regular rate and rhythm, S1 and S2 normal, no murmur, rub or gallop   Extremities: Extremities normal, atraumatic, no cyanosis or edema   Pulses: 2+ and symmetric all extremities   Skin: Skin color, texture, turgor normal, no rashes or lesions   Neurologic: CNII-XII intact, normal strength, sensation and reflexes throughout     ASSESSMENT:  1. Essential hypertension, benign    2. Chronic midline low back pain without sciatica         PLAN:   1. Essential hypertension, benign  Blood pressure slightly elevated 152/68. We will continue to monitor. Continue losartan 50 mg twice a day Lasix 40 mg daily. Watch salt intake. 2. Chronic midline low back pain without sciatica  Apply ice and take Tylenol extra strength since patient is on Xarelto, cannot take NSAIDs. Sent for physical therapy. [address]      Return in about 6 weeks (around 3/23/2022) for back pain follow-up. Electronically signed by Vivi Madsen MD on 2/9/2022 at 2:11 PM.    This dictation was generated by voice recognition computer software. Although all attempts are made to edit the dictation for accuracy, there may be errors in the transcription that are not intended.

## 2022-02-15 ENCOUNTER — HOSPITAL ENCOUNTER (OUTPATIENT)
Dept: PHYSICAL THERAPY | Age: 67
Setting detail: THERAPIES SERIES
Discharge: HOME | End: 2022-02-15
Payer: COMMERCIAL

## 2022-02-15 PROCEDURE — 97530 THERAPEUTIC ACTIVITIES: CPT

## 2022-02-15 PROCEDURE — 97162 PT EVAL MOD COMPLEX 30 MIN: CPT

## 2022-02-15 NOTE — FLOWSHEET NOTE
[address] [facility]  Phone: [phone]   Fax: [phone]    Physical Therapy Treatment Note/ Progress Report:     Date:  2/15/2022    Patient Name:  Rocio Dominique    :  1955  MRN: [id number]  Restrictions/Precautions:    Medical/Treatment Diagnosis Information:  · Diagnosis: M54.50, G89.29 (ICD-10-CM) - Chronic midline low back pain without sciatica  · Treatment Diagnosis: pain with end range lumbar AROM, decreased hip flexion and hip abduction strength, decreased core strength, gait impairments. Insurance/Certification information:  PT Insurance Information: [facility] [employer]  Physician Information:  Referring Practitioner: Amada Haines MD  Plan of care signed (Y/N): []  Yes [x]  No    Date of Patient follow up with Physician:      Progress Report: []  Yes  [x]  No     Date Range for reporting period:  Beginnin/15/22  PN:  Ending:     Progress report due (10 Rx/or 30 days whichever is less): visit #10 or      Recertification due (POC duration/ or 90 days whichever is less): visit #10 or 5/15/22 (date)     Visit # Insurance Allowable Auth required?  Date Range   1/10 20 visits, $25 co-pay []  Yes  [x]  No NA       Latex Allergy:  [x]NO      []YES  Preferred Language for Healthcare:   [x]English       []other:    Functional Scale:        Date assessed:  SPEEDY: raw score =8 ; dysfunction = 16%    2/15/22    Pain level:  0-6/10     SUBJECTIVE:  See eval    OBJECTIVE: See eval   Observation:    Test measurements:      RESTRICTIONS/PRECAUTIONS: none    Exercises/Interventions:     Therapeutic Exercise (83313) Resistance / level Sets / Seconds Reps Notes / Cues          Bike/seated stepper       HS/HF stretch       ITB stretching       SLR       SL hip abd       SL hip abd iso with concentric flexion/ext              TG squats       Lateral stepping with band                            LTR       SKTC              Therapeutic Activities (62680)       Mat taps High/mod                                   Neuromuscular Re-ed (68767)       Tandem balance       Monster walks       Wall falls       Lunge excursions              Manual Intervention (80726)       Prone PA mobs Grade IV      STM R lumbar paraspinal, quadratus lumborum, glute med       Long axis hip distraction                                                   Modalities:     Pt. Education:  -pt educated on diagnosis, prognosis and expectations for rehab  -all pt questions were answered    Home Exercise Program:  For NV consider: laying hip ITB stretch, SLR, SL hip abd, lateral stepping with band    Therapeutic Exercise and NMR EXR  [] (16320) Provided verbal/tactile cueing for activities related to strengthening, flexibility, endurance, ROM  for improvements in proximal hip and core control with self care, mobility, lifting and ambulation.  [] (74148) Provided verbal/tactile cueing for activities related to improving balance, coordination, kinesthetic sense, posture, motor skill, proprioception  to assist with core control in self care, mobility, lifting, and ambulation.   [] (85784) Therapist is in constant attendance of 2 or more patients providing skilled therapy interventions, but not providing any significant amount of measurable one-on-one time to either patient, for improvements in LE, proximal hip, and core control in self care, mobility, lifting, ambulation and eccentric single leg control.      Therapeutic Activities:    [x] (48248 or 75398) Provided verbal/tactile cueing for activities related to improving balance, coordination, kinesthetic sense, posture, motor skill, proprioception and motor activation to allow for proper function  with self care and ADLs  [] (07570) Provided training and instruction to the patient for proper core and proximal hip recruitment and positioning with ambulation re-education     Home Exercise Program:    [x] (39607) Reviewed/Progressed HEP activities related to strengthening, flexibility, endurance, ROM of core, proximal hip and LE for functional self-care, mobility, lifting and ambulation   [] (96723) Reviewed/Progressed HEP activities related to improving balance, coordination, kinesthetic sense, posture, motor skill, proprioception of core, proximal hip and LE for self care, mobility, lifting, and ambulation      Manual Treatments:  PROM / STM / Oscillations-Mobs:  G-I, II, III, IV (PA's, Inf., Post.)  [] (38870) Provided manual therapy to mobilize proximal hip and LS spine soft tissue/joints for the purpose of modulating pain, promoting relaxation,  increasing ROM, reducing/eliminating soft tissue swelling/inflammation/restriction, improving soft tissue extensibility and allowing for proper ROM for normal function with self care, mobility, lifting and ambulation. Charges:  Timed Code Treatment Minutes: 25   Total Treatment Minutes: 45       [] EVAL - LOW (23023)   [x] EVAL - MOD (48290)  [] EVAL - HIGH (47892)  [] RE-EVAL (85454)  [] WA(40820) x      [] Ionto  [] NMR (62839) x      [] Vaso  [] Manual (42157) x      [] Ultrasound  [x] TA x   2    [] Mech Traction (37214)  [] GaitTraining x     [] ES (un) (21367):   [] Home Management Training [] ES(attended) (20860)             [] Aquatics    [] Group  [] Other    Goals:   Patient stated goal: decrease pain. []? Progressing: []? Met: []? Not Met: []? Adjusted     Therapist goals for Patient:   Short Term Goals: To be achieved in: 2 weeks  1. Independent in HEP and progression per patient tolerance, in order to prevent re-injury. []? Progressing: []? Met: []? Not Met: []? Adjusted  2. Patient will have a decrease in pain to facilitate improvement in movement, function, and ADLs as indicated by Functional Deficits. []? Progressing: []? Met: []? Not Met: []? Adjusted     Long Term Goals: To be achieved in: 6 weeks  1.  Disability index score of 10% or less for the SPEEDY to assist with reaching prior level of function. []? Progressing: []? Met: []? Not Met: []? Adjusted  2. Patient will demonstrate increased AROM to WNL, good LS mobility, good hip ROM to allow for proper joint functioning as indicated by patients Functional Deficits. []? Progressing: []? Met: []? Not Met: []? Adjusted  3. Patient will demonstrate an increase in Strength to good proximal hip and core activation to allow for proper functional mobility as indicated by patients Functional Deficits. []? Progressing: []? Met: []? Not Met: []? Adjusted  4. Patient will return to functional activities including sitting for 1 hour without increased symptoms or restriction when returning to standing. []? Progressing: []? Met: []? Not Met: []? Adjusted  5. Pt will report pain decreased to 3/10 at worst for improved activity tolerance and functional mobility. []? Progressing: []? Met: []? Not Met: []? Adjusted       Overall Progression Towards Functional goals/ Treatment Progress Update:  [] Patient is progressing as expected towards functional goals listed. [] Progression is slowed due to complexities/Impairments listed. [] Progression has been slowed due to co-morbidities.   [x] Plan just implemented, too soon to assess goals progression <30days   [] Goals require adjustment due to lack of progress  [] Patient is not progressing as expected and requires additional follow up with physician  [] Other    Persisting Functional Limitations/Impairments:  [x]Sitting [x]Standing   [x]Walking [x]Squatting/bending    [x]Stairs [x]ADL's    [x]Transfers [x]Reaching  [x]Housework [x]Job related tasks  [x]Driving [x]Sports/Recreation   []Sleeping []Other:    ASSESSMENT:  See eval  Treatment/Activity Tolerance:  [] Patient able to complete tx  [] Patient limited by fatique  [] Patient limited by pain  [] Patient limited by other medical complications  [] Other:     Prognosis: [x] Good [x] Fair  [] Poor    Patient Requires Follow-up: [x] Yes  [] No    PLAN: See eval. PT 1-2x / week for 6 weeks. pt can only come on tues/wed and has 25$ co-pay so will plan for 1x/week in clinic with focus on HEP  [] Continue per plan of care [] Alter current plan (see comments)  [x] Plan of care initiated [] Hold pending MD visit [] Discharge    Electronically signed by: John Deutsch, PT, DPT      Note: If patient does not return for scheduled/ recommended follow up visits, this note will serve as a discharge from care along with most recent update on progress.

## 2022-02-15 NOTE — PLAN OF CARE
[address] [address], [facility] [address]  Phone: [phone]   Fax: [phone]     Physical Therapy Certification    Dear Referring Practitioner: Teo Saunders MD,    We had the pleasure of evaluating the following patient for physical therapy services at [address] [place] [address]. A summary of our findings can be found in the initial assessment below. This includes our plan of care. If you have any questions or concerns regarding these findings, please do not hesitate to contact me at the office phone number checked above. Thank you for the referral.       Physician Signature:_______________________________Date:__________________  By signing above (or electronic signature), therapists plan is approved by physician      Patient: Tara Keithtingham   : 1955   MRN: [id number]  Referring Physician: Referring Practitioner: Teo Saunders MD      Evaluation Date: 2/15/2022      Medical Diagnosis Information:  Diagnosis: M54.50, G89.29 (ICD-10-CM) - Chronic midline low back pain without sciatica   Treatment Diagnosis: pain with end range lumbar AROM, decreased hip flexion and hip abduction strength, decreased core strength, gait impairments. Insurance information: PT Insurance Information: [facility]     Precautions/ Contra-indications: none  Latex Allergy:  [x]NO      []YES  Preferred Language for Healthcare:   [x]English       []Other:    C-SSRS Triggered by Intake questionnaire (Past 2 wk assessment ):   [x] No, Questionnaire did not trigger screening.   [] Yes, Patient intake triggered C-SSRS Screening     [] Completed, no further action required. [] Completed, PCP notified via Epic    SUBJECTIVE: Patient stated complaint: Low back pain with pain into the R buttock onset >1 year with gradual progression in intensity. Never extends further than the buttock.   Pt also had an instance ~ 3 months where she was walking and there was a pain/pop in the R hip which is giving some R lateral hip pain, reports that at the doctor office she was asked to stand on the R leg and had difficulty doing so. Low back pain is also present with bending forward and ascending stairs. Has 12 stairs at home which is difficult and painful to complete. After prolonged walking there is a throbbing pain in the back that is relieved with sitting but then when she goes to get back up it is very painful and difficult to stand. Has been taking Tylenol which has not helped, unable to take NSAIDs. Fear avoidance: I should not do physical activities that (might) make my pain worse   [x] True   [] False     Relevant Medical History: history of Covid, hypertension. Cannot take NSAIDS. Functional Outcome: Oswestry: raw score = 8; dysfunction = 16%    Pain Scale: 0-6/10  Easing factors: sitting, laying down  Provocative factors: prolonged activity, stairs. 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 Living Status/Prior Level of Function: Prior to this injury / incident, pt was independent with ADLs and IADLs, no pain or decreased function previously. OBJECTIVE:   Palpation: TTP R lumbar paraspinals, R quadratus lumborum    Functional Mobility/Transfers: antalgic, STS and bed mobility are antalgic    Posture: WNL    Inspection: WNL, report recent 25 lb weight gain (may be d/t being on prednisone, MD aware)    Gait: (include devices/WB status) slight trendelenburg gait pattern, antalgic transfer from sitting to standing.      Bandages/Dressings/Incisions: NA    Dermatomes Normal Abnormal Comments   inguinal area (L1)       anterior mid-thigh (L2)      distal ant thigh/med knee (L3)      medial lower leg and foot (L4)      lateral lower leg and foot (L5)      posterior calf (S1)      medial calcaneus (S2)          Reflexes Normal Abnormal Comments   S1-2 Seated achilles      S1-2 Prone knee bend      L3-4 Patellar tendon      Clonus      Babinski          ROM  Comments   Lumbar Flex 60 deg No increase in symptoms   Lumbar Ext 20 deg No increase in symptoms     ROM LEFT RIGHT Comments   Lumbar Side Bend Full and painless  Full with pain in the R lumbar/glute    Lumbar Rotation Full and painless Full with a twinge of pain in the R lumbar/glute    Hip Flexion      Hip Abd      Hip ER      Hip IR      Hip Extension      Knee Ext      Knee Flex      Hamstring Flex      Piriformis                      Joint mobility:    []Normal    [x]Hypo (mild-lumbar)   []Hyper      Strength / Myotomes LEFT RIGHT Comments   Multifidus 4- 4-    Transverse Ab 4- 4-    Hip Flexors (L1-2) 4- 4-    Hip Abductors 5 4- with twinge of pain    Hip Extensors 4+ 4+    Hip Internal Rotators      Hip External Rotators      Quads (L2-4) 5 5    Hamstrings  5 5    Ankle Plantarflexion (S1-2)      Ankle Dorsiflexion (L4-5)      Ankle Inversion      Ankle Eversion (S1-2)      Great Toe Extension (L5)              Neural dynamic tension testing Normal Abnormal Comments   Slump Test  - Degree of knee flexion:       SLR       0-30      30-70      Femoral nerve (L2-4)          Orthopedic Special Tests:    Normal Abnormal N/A Comments   Toe walk         Heel Walk       Fwd Bend-aberrant or innominate mvmt)       Trendelenburg       Kemps/Quadrant       Stork       ADRIEL/Juan Manuel       Hip scour       SLR       Crossed SLR       Supine to sit       Hip thrust       SI distraction/compression       PA/Spring       Prone Instability test       Prone knee bend       Sacral Spring/thrust                  [x] Patient history, allergies, meds reviewed. Medical chart reviewed. See intake form.      Review Of Systems (ROS):  [x]Performed Review of systems (Integumentary, CardioPulmonary, Neurological) by intake and observation. Intake form has been scanned into medical record. Patient has been instructed to contact their primary care physician regarding ROS issues if not already being addressed at this time.       Co-morbidities/Complexities (which will affect course of rehabilitation):   []None        [x]Hx of COVID   Arthritic conditions   []Rheumatoid arthritis (M05.9)  []Osteoarthritis (M19.91)  []Gout   Cardiovascular conditions   [x]Hypertension (I10)  []Hyperlipidemia (E78.5)  []Angina pectoris (I20)  []Atherosclerosis (I70)  []Pacemaker  []Hx of CABG/stent/  cardiac surgeries   Musculoskeletal conditions   []Disc pathology   []Congenital spine pathologies   []Osteoporosis (M81.8)  []Osteopenia (M85.8)  []Scoliosis       Endocrine conditions   []Hypothyroid (E03.9)  []Hyperthyroid Gastrointestinal conditions   []Constipation (R72.12)   Metabolic conditions   []Morbid obesity (E66.01)  []Diabetes type 1(E10.65) or 2 (E11.65)   []Neuropathy (G60.9)     Cardio/Pulmonary conditions   []Asthma (J45)  []Coughing   []COPD (J44.9)  []CHF  []A-fib   Psychological Disorders  [x]Anxiety (F41.9)  [x]Depression (F32.9)   []Other:   Developmental Disorders  []Autism (F84.0)  []CP (G80)  []Down Syndrome (Q90.9)  []Developmental delay     Neurological conditions  []Prior Stroke (I69.30)  []Parkinson's (G20)  []Encephalopathy (G93.40)  []MS (G35)  []Post-polio (G14)  []SCI  []TBI  []ALS Other conditions  []Fibromyalgia (M79.7)  []Vertigo  []Syncope  []Kidney Failure  []Cancer      []currently undergoing                treatment  []Pregnancy  []Incontinence   Prior surgeries  []involved limb  []previous spinal surgery  [] section birth  []hysterectomy  []bowel / bladder surgery  []other relevant surgeries   []Other:              Barriers to/and or personal factors that will affect rehab potential:              [x]Age  []Sex [x]Smoker              []Motivation/Lack of Motivation                        [x]Co-Morbidities              []Cognitive Function, education/learning barriers              []Environmental, home barriers              []profession/work barriers  []past PT/medical experience  []other:  Justification:     Falls Risk Assessment (30 days):   [x] Falls Risk assessed and no intervention required. [] Falls Risk assessed and Patient requires intervention due to being higher risk   TUG score (>12s at risk):     [] Falls education provided, including         ASSESSMENT: Pt is a 77 yr old female presenting with LBP and hip pain consistent with lumbar arthritis and glute tendonitis or bursitis of the hip. Pt's lumbar pain is consistent with arthritis as it show no directional preference, is worst with prolonged inactivity and shows mild to moderate hypomobility. Pt's hip pain is consistent with glute med tendonitis or bursitis as pt demo TTP in the glute and lateral hip, decreased strength and pain with both hip abduction MMT, standing on the R LE, and ambulation. D/t pt's symptoms she has difficulty completing stairs, tolerating work (pain significantly increased when she attempts to get out of the car after driving home from work/prolonged sitting), and difficulty being as active as she would like to lose the recent weight she has gained. Pt would benefit from skilled physical therapy to address these impairments and allow pt to return to PLOF.     Functional Impairments:     [x]Noted lumbar/proximal hip hypomobility   []Noted lumbosacral and/or generalized hypermobility   [x]Decreased Lumbosacral/hip/LE functional ROM   [x]Decreased core/proximal hip strength and neuromuscular control    [x]Decreased LE functional strength    []Abnormal reflexes/sensation/myotomal/dermatomal deficits  [x]Reduced balance/proprioceptive control    []other:      Functional Activity Limitations (from functional questionnaire and intake)   [x]Reduced ability to tolerate prolonged functional positions   [x]Reduced ability or difficulty with changes of positions or transfers between positions   [x]Reduced ability to maintain good posture and demonstrate good body mechanics with sitting, bending, and lifting   [x]Reduced ability to sleep   [x] Reduced ability or tolerance with driving and/or computer work   [x]Reduced ability to perform lifting, reaching, carrying tasks   [x]Reduced ability to squat   [x]Reduced ability to forward bend   [x]Reduced ability to ambulate prolonged functional periods/distances/surfaces   [x]Reduced ability to ascend/descend stairs   []other:       Participation Restrictions   []Reduced participation in self care activities   [x]Reduced participation in home management activities   [x]Reduced participation in work activities   [x]Reduced participation in social activities. [x]Reduced participation in sport/recreational activities. Classification:   []Signs/symptoms consistent with Lumbar instability/stabilization subgroup. []Signs/symptoms consistent with Lumbar mobilization/manipulation subgroup, myotomes and dermatomes intact. Meets manipulation criteria. []Signs/symptoms consistent with Lumbar direction specific/centralization subgroup   []Signs/symptoms consistent with Lumbar traction subgroup     [x]Signs/symptoms consistent with hip bursitis or glute med tendonitis   [x]Signs/symptoms consistent with lumbar arthritis   []Signs/symptoms consistent with nerve root involvement including myotome & dermatome dysfunction   []Signs/symptoms consistent with post-surgical status including: decreased ROM, strength and function.    []signs/symptoms consistent with pathology which may benefit from Dry needling     []other:      Prognosis/Rehab Potential:      []Excellent   [x]Good    []Fair   []Poor    Tolerance of evaluation/treatment:    []Excellent   [x]Good    []Fair   []Poor     Physical Therapy Evaluation Complexity Justification  [x] A history of present problem with:  [] no personal factors and/or comorbidities that impact the plan of care;  [x]1-2 personal factors and/or comorbidities that impact the plan of care  []3 personal factors and/or comorbidities that impact the plan of care  [x] An examination of body systems using standardized tests and measures addressing any of the following: body structures and functions (impairments), activity limitations, and/or participation restrictions;:  [] a total of 1-2 or more elements   [x] a total of 3 or more elements   [] a total of 4 or more elements   [x] A clinical presentation with:  [] stable and/or uncomplicated characteristics   [x] evolving clinical presentation with changing characteristics  [] unstable and unpredictable characteristics;   [x] Clinical decision making of [] low, [x] moderate, [] high complexity using standardized patient assessment instrument and/or measurable assessment of functional outcome. [] EVAL (LOW) 38411 (typically 15 minutes face-to-face)  [x] EVAL (MOD) 16104 (typically 30 minutes face-to-face)  [] EVAL (HIGH) 32851 (typically 45 minutes face-to-face)  [] RE-EVAL     PLAN: Begin PT focusing on: proximal hip mobilizations, LB mobs, LB core activation, proximal hip activation, and HEP    Frequency/Duration:  1 days per week for 6 Weeks: (pt can only come on tues/wed and has 25$ co-pay so will plan for 1x/week in clinic with focus on HEP)  Interventions:  [x]  Therapeutic exercise including: strength training, ROM, for LE, Glutes and core   [x]  NMR activation and proprioception for glutes , LE and Core   [x]  Manual therapy as indicated for Hip complex, LE and spine to include: Dry Needling/IASTM, STM, PROM, Gr I-IV mobilizations, manipulation.    [x]  Modalities as needed that may include: thermal agents, E-stim, Biofeedback, US, iontophoresis as indicated  [x]  Patient education on joint protection, postural re-education, activity modification, progression of HEP. HEP instruction: Written HEP instructions provided and reviewed     GOALS:  Patient stated goal: decrease pain. [] Progressing: [] Met: [] Not Met: [] Adjusted    Therapist goals for Patient:   Short Term Goals: To be achieved in: 2 weeks  1. Independent in HEP and progression per patient tolerance, in order to prevent re-injury. [] Progressing: [] Met: [] Not Met: [] Adjusted  2. Patient will have a decrease in pain to facilitate improvement in movement, function, and ADLs as indicated by Functional Deficits. [] Progressing: [] Met: [] Not Met: [] Adjusted    Long Term Goals: To be achieved in: 6 weeks  1. Disability index score of 10% or less for the SPEEDY to assist with reaching prior level of function. [] Progressing: [] Met: [] Not Met: [] Adjusted  2. Patient will demonstrate increased AROM to WNL, good LS mobility, good hip ROM to allow for proper joint functioning as indicated by patients Functional Deficits. [] Progressing: [] Met: [] Not Met: [] Adjusted  3. Patient will demonstrate an increase in Strength to good proximal hip and core activation to allow for proper functional mobility as indicated by patients Functional Deficits. [] Progressing: [] Met: [] Not Met: [] Adjusted  4. Patient will return to functional activities including sitting for 1 hour without increased symptoms or restriction when returning to standing. [] Progressing: [] Met: [] Not Met: [] Adjusted  5. Pt will report pain decreased to 3/10 at worst for improved activity tolerance and functional mobility.     [] Progressing: [] Met: [] Not Met: [] Adjusted     Electronically signed by:  Maeve Phan, PT, DPT

## 2022-02-22 ENCOUNTER — HOSPITAL ENCOUNTER (OUTPATIENT)
Dept: PHYSICAL THERAPY | Age: 67
Setting detail: THERAPIES SERIES
Discharge: HOME | End: 2022-02-22
Payer: COMMERCIAL

## 2022-02-22 DIAGNOSIS — I10 ESSENTIAL HYPERTENSION, BENIGN: Primary | ICD-10-CM

## 2022-02-22 PROCEDURE — 97112 NEUROMUSCULAR REEDUCATION: CPT

## 2022-02-22 PROCEDURE — 97140 MANUAL THERAPY 1/> REGIONS: CPT

## 2022-02-22 PROCEDURE — 97110 THERAPEUTIC EXERCISES: CPT

## 2022-02-22 RX ORDER — LOSARTAN POTASSIUM 50 MG/1
50 TABLET ORAL 2 TIMES DAILY
Qty: 180 TABLET | Refills: 3 | Status: SHIPPED | OUTPATIENT
Start: 2022-02-22 | End: 2022-09-26 | Stop reason: SDUPTHER

## 2022-02-22 NOTE — FLOWSHEET NOTE
[address] [facility] Physical Therapy  Phone: [phone]   Fax: [phone]    Physical Therapy Treatment Note/ Progress Report:     Date:  2022    Patient Name:  Lakhwinder Hylton    :  1955  MRN: [id number]  Restrictions/Precautions:    Medical/Treatment Diagnosis Information:  · Diagnosis: M54.50, G89.29 (ICD-10-CM) - Chronic midline low back pain without sciatica  · Treatment Diagnosis: pain with end range lumbar AROM, decreased hip flexion and hip abduction strength, decreased core strength, gait impairments. Insurance/Certification information:  PT Insurance Information: [facility]  Physician Information:  Referring Practitioner: Sal Dandy, MD  Plan of care signed (Y/N): []  Yes [x]  No    Date of Patient follow up with Physician:      Progress Report: []  Yes  [x]  No     Date Range for reporting period:  Beginnin/15/22  PN:  Ending:     Progress report due (10 Rx/or 30 days whichever is less): visit #10 or  23    Recertification due (POC duration/ or 90 days whichever is less): visit #10 or 5/15/22 (date)     Visit # Insurance Allowable Auth required? Date Range   2/10 20 visits, $25 co-pay []  Yes  [x]  No NA       Latex Allergy:  [x]NO      []YES  Preferred Language for Healthcare:   [x]English       []other:    Functional Scale:        Date assessed:  SPEEDY: raw score =8 ; dysfunction = 16%    2/15/22    Pain level:  7/10     SUBJECTIVE:  Pt denies pain laying on sides. Back pain is worse because she works 10 hr shifts at [employer] and stands/walks on concrete all day. Pt would like to lose weight and is frustrated with where she is at.     OBJECTIVE: See eval   Observation:    Test measurements:      RESTRICTIONS/PRECAUTIONS: none    Exercises/Interventions:     Therapeutic Exercise (22530) Resistance / level Sets / Seconds Reps Notes / Cues          Bike/seated stepper Lv4 4'  s=9   HSS  HFS  30\"  30\" 2 B  2 R : pt does not feel L HFS, therefore performed on R only   ITB stretching       SLR       SL hip abd  1 5 2/22: inc R lateral hip pain   SL hip ER and IR (clams, reverse clams)  1 10 R ea 2/22   SL hip abd iso with concentric flexion/ext              TG squats       Lateral stepping with band                            LTR       SKTC              Therapeutic Activities (86704)       Mat taps High/mod                                   Neuromuscular Re-ed (42268)       TrA progressions:  - TrA set  - TrA + march  - TrA + BKFO    10\"  1  1   5  10 B  10 B 2/22                 Tandem balance       Monster walks  Lateral walks [place] 1 lap  3 laps  2/22: monster walks inc R lateral hip pain   Wall falls       Lunge excursions              Manual Intervention (64313)       Prone PA mobs Grade IV      STM R lumbar paraspinal, quadratus lumborum, glute med       Long axis hip distraction              PA over R PSIS in prone  - STM pre and post PA's to reduce muscle tension for improved tolerance to PA's Gr IV 6x10\"  8' W/ oscillations 2/22: reproduced pt's pain initially, improved with subsequent mobilizations                                 Modalities:     Pt. Education:  -pt educated on diagnosis, prognosis and expectations for rehab  -all pt questions were answered    Home Exercise Program:  For NV consider: laying hip ITB stretch, SLR, SL hip abd, lateral stepping with band    Access Code: [email or web address]  URL: [email or web address]  Date: 02/22/2022  Prepared by: Den Laser    Exercises  Clamshell - 1 x daily - 7 x weekly - 2 sets - 10 reps  Sidelying Reverse Clamshell - 1 x daily - 7 x weekly - 2 sets - 10 reps  Side Stepping with Resistance at Ankles - 1 x daily - 7 x weekly - 2 sets - 10 reps  Supine Transversus Abdominis Bracing - Hands on Stomach - 1 x daily - 7 x weekly - 1 sets - 5 reps - 10 hold      Therapeutic Exercise and NMR EXR  [x] (72810) Provided verbal/tactile cueing for activities related to strengthening, flexibility, endurance, ROM  for improvements in proximal hip and core control with self care, mobility, lifting and ambulation.  [] (97278) Provided verbal/tactile cueing for activities related to improving balance, coordination, kinesthetic sense, posture, motor skill, proprioception  to assist with core control in self care, mobility, lifting, and ambulation.   [] (40854) Therapist is in constant attendance of 2 or more patients providing skilled therapy interventions, but not providing any significant amount of measurable one-on-one time to either patient, for improvements in LE, proximal hip, and core control in self care, mobility, lifting, ambulation and eccentric single leg control.      Therapeutic Activities:    [x] (90799 or 35418) Provided verbal/tactile cueing for activities related to improving balance, coordination, kinesthetic sense, posture, motor skill, proprioception and motor activation to allow for proper function  with self care and ADLs  [] (23293) Provided training and instruction to the patient for proper core and proximal hip recruitment and positioning with ambulation re-education     Home Exercise Program:    [x] (67409) Reviewed/Progressed HEP activities related to strengthening, flexibility, endurance, ROM of core, proximal hip and LE for functional self-care, mobility, lifting and ambulation   [] (09048) Reviewed/Progressed HEP activities related to improving balance, coordination, kinesthetic sense, posture, motor skill, proprioception of core, proximal hip and LE for self care, mobility, lifting, and ambulation      Manual Treatments:  PROM / STM / Oscillations-Mobs:  G-I, II, III, IV (PA's, Inf., Post.)  [] (59360) Provided manual therapy to mobilize proximal hip and LS spine soft tissue/joints for the purpose of modulating pain, promoting relaxation,  increasing ROM, reducing/eliminating soft tissue swelling/inflammation/restriction, improving soft tissue extensibility and allowing for proper ROM for normal function with self care, mobility, lifting and ambulation. Charges:  Timed Code Treatment Minutes: 45   Total Treatment Minutes: 45       [] EVAL - LOW (03494)   [] EVAL - MOD (77029)  [] EVAL - HIGH (92566)  [] RE-EVAL (84662)  [x] NK(55956) x 1     [] Ionto  [x] NMR (00920) x 1    [] Vaso  [x] Manual (34917) x 1     [] Ultrasound  [] TA x       [] Mech Traction (88137)  [] GaitTraining x     [] ES (un) (02335):   [] Home Management Training [] ES(attended) (40689)             [] Aquatics    [] Group  [] Other    Goals:   Patient stated goal: decrease pain. []? Progressing: []? Met: []? Not Met: []? Adjusted     Therapist goals for Patient:   Short Term Goals: To be achieved in: 2 weeks  1. Independent in HEP and progression per patient tolerance, in order to prevent re-injury. []? Progressing: []? Met: []? Not Met: []? Adjusted  2. Patient will have a decrease in pain to facilitate improvement in movement, function, and ADLs as indicated by Functional Deficits. []? Progressing: []? Met: []? Not Met: []? Adjusted     Long Term Goals: To be achieved in: 6 weeks  1. Disability index score of 10% or less for the SPEEDY to assist with reaching prior level of function. []? Progressing: []? Met: []? Not Met: []? Adjusted  2. Patient will demonstrate increased AROM to WNL, good LS mobility, good hip ROM to allow for proper joint functioning as indicated by patients Functional Deficits. []? Progressing: []? Met: []? Not Met: []? Adjusted  3. Patient will demonstrate an increase in Strength to good proximal hip and core activation to allow for proper functional mobility as indicated by patients Functional Deficits. []? Progressing: []? Met: []? Not Met: []? Adjusted  4. Patient will return to functional activities including sitting for 1 hour without increased symptoms or restriction when returning to standing. []? Progressing: []? Met: []? Not Met: []? Adjusted  5.  Pt will report pain decreased to 3/10 at worst for improved activity tolerance and functional mobility. []? Progressing: []? Met: []? Not Met: []? Adjusted       Overall Progression Towards Functional goals/ Treatment Progress Update:  [] Patient is progressing as expected towards functional goals listed. [] Progression is slowed due to complexities/Impairments listed. [] Progression has been slowed due to co-morbidities. [x] Plan just implemented, too soon to assess goals progression <30days   [] Goals require adjustment due to lack of progress  [] Patient is not progressing as expected and requires additional follow up with physician  [] Other    Persisting Functional Limitations/Impairments:  [x]Sitting [x]Standing   [x]Walking [x]Squatting/bending    [x]Stairs [x]ADL's    [x]Transfers [x]Reaching  [x]Housework [x]Job related tasks  [x]Driving [x]Sports/Recreation   []Sleeping []Other:    ASSESSMENT:  Exercises introduced per log. Pt with moderate increase in R lateral hip pain with SL SLR and monster walks, denies exacerbation with lateral band walks. Pt responded very well to PA over R PSIS, noted decreased pain at end of treatment. Printed HEP and provided pt with orange TB. Pt would benefit from continued skilled PT to further address listed deficits for return to PLOF and dec pain with transfers, standing, stairs, and walking to allow for full work tasks. Treatment/Activity Tolerance:  [x] Patient able to complete tx  [] Patient limited by fatique  [] Patient limited by pain  [] Patient limited by other medical complications  [] Other:     Prognosis: [x] Good [x] Fair  [] Poor    Patient Requires Follow-up: [x] Yes  [] No    PLAN: See eval. PT 1-2x / week for 6 weeks.  pt can only come on tues/wed and has 25$ co-pay so will plan for 1x/week in clinic with focus on HEP  [x] Continue per plan of care [] Alter current plan (see comments)  [] Plan of care initiated [] Hold pending MD visit [] Discharge    Electronically signed by: Dayanara Mcdowell, PT, DPT      Note: If patient does not return for scheduled/ recommended follow up visits, this note will serve as a discharge from care along with most recent update on progress.

## 2022-02-22 NOTE — TELEPHONE ENCOUNTER
Requested Prescriptions     Pending Prescriptions Disp Refills    losartan (COZAAR) 50 MG tablet 30 tablet 3     Sig: Take 1 tablet by mouth 2 times daily

## 2022-02-22 NOTE — TELEPHONE ENCOUNTER
----- Message from Bella Boyd sent at 2/22/2022 10:28 AM EST -----  Subject: Refill Request    QUESTIONS  Name of Medication? losartan (COZAAR) 50 MG tablet  Patient-reported dosage and instructions? 1po bid  How many days do you have left? 0  Preferred Pharmacy? [facility]  Pharmacy phone number (if available)? [phone]  ---------------------------------------------------------------------------  --------------  [facility] INFO  What is the best way for the office to contact you? OK to leave message on   voicemail  Preferred Call Back Phone Number?  [phone]

## 2022-03-01 ENCOUNTER — HOSPITAL ENCOUNTER (OUTPATIENT)
Dept: PHYSICAL THERAPY | Age: 67
Setting detail: THERAPIES SERIES
Discharge: HOME | End: 2022-03-01
Payer: COMMERCIAL

## 2022-03-01 PROCEDURE — 97110 THERAPEUTIC EXERCISES: CPT

## 2022-03-01 PROCEDURE — 97112 NEUROMUSCULAR REEDUCATION: CPT

## 2022-03-01 PROCEDURE — 97140 MANUAL THERAPY 1/> REGIONS: CPT

## 2022-03-01 NOTE — FLOWSHEET NOTE
[address] [facility] Physical Therapy  Phone: [phone]   Fax: [phone]    Physical Therapy Treatment Note/ Progress Report:     Date:  3/1/2022    Patient Name:  Angeles Garcia    :  1955  MRN: [id number]  Restrictions/Precautions:    Medical/Treatment Diagnosis Information:  · Diagnosis: M54.50, G89.29 (ICD-10-CM) - Chronic midline low back pain without sciatica  · Treatment Diagnosis: pain with end range lumbar AROM, decreased hip flexion and hip abduction strength, decreased core strength, gait impairments. Insurance/Certification information:  PT Insurance Information: [facility] Choice Plus  Physician Information:  Referring Practitioner: Herminio Herrmann MD  Plan of care signed (Y/N): []  Yes [x]  No    Date of Patient follow up with Physician:      Progress Report: []  Yes  [x]  No     Date Range for reporting period:  Beginnin/15/22  PN:  Ending:     Progress report due (10 Rx/or 30 days whichever is less): visit #10 or  3/01/66    Recertification due (POC duration/ or 90 days whichever is less): visit #10 or 5/15/22 (date)     Visit # Insurance Allowable Auth required? Date Range   3/10 20 visits, $25 co-pay []  Yes  [x]  No NA       Latex Allergy:  [x]NO      []YES  Preferred Language for Healthcare:   [x]English       []other:    Functional Scale:        Date assessed:  SPEEDY: raw score =8 ; dysfunction = 16%    2/15/22    Pain level:  2/10     SUBJECTIVE:  Pt reports she has been feeling really pretty good since last visit, felt like what we worked on last time really helped. Pt notes stiffness when she gets up after she is sitting for any period > 10 min. Still pretty sore and increased pain after working. Pain is mostly located over R lateral hip today.     OBJECTIVE:   3/1: unable to reproduce lateral hip pain with palpation of lateral thigh and lateral hip musculature, no TTP over greater trochanter - discussed likelihood of lateral hip pain being referred from low back, but will continue to monitor      RESTRICTIONS/PRECAUTIONS: none    Exercises/Interventions:     Therapeutic Exercise (06231) Resistance / level Sets / Seconds Reps Notes / Cues          Bike/seated stepper Lv4 4'  s=9   HSS  HFS  30\"  30\" 2 B  2 R 2/22: pt does not feel L HFS, therefore performed on R only   ITB stretching       SLR       SL hip abd  2/22: inc R lateral hip pain   SL hip ER and IR (clams, reverse clams)  2 10 R ea 2/22   SL hip abd iso with concentric flexion/ext       Hooklying hip abd/ER iso with belt around lower thighs  5\" 10 3/1          TG squats                            LTR       SKTC              Therapeutic Activities (85849)       Mat taps High/mod                                   Neuromuscular Re-ed (90233)       TrA progressions:  - TrA set  - TrA + march  - TrA + BKFO      1  1     10 B  10 B 2/22                 Tandem balance       Monster walks  Lateral walks [facility]   3 laps  2/22: monster walks inc R lateral hip pain   Wall falls       Lunge excursions              Manual Intervention (58974)       Prone PA mobs Grade IV      STM R lumbar paraspinal, quadratus lumborum, glute med       Long axis hip distraction              PA over R PSIS in prone  - STM pre and post PA's to reduce muscle tension for improved tolerance to PA's Gr IV 6x10\"  8' W/ oscillations 2/22: reproduced pt's pain initially, improved with subsequent mobilizations   PA over R PSIS in prone with bias into R sidebending  6x10\"  3/1          STM R lateral thigh and R hip musculature  2'  3/1: pt without tenderness in area/no reproduction of symptoms, therefore discontinued after 2'            Modalities:     Pt. Education:  -pt educated on diagnosis, prognosis and expectations for rehab  -all pt questions were answered    Home Exercise Program:  For NV consider: laying hip ITB stretch, SLR, SL hip abd, lateral stepping with band    Access Code: [email or web address]  URL: [email or web address]  Date: 02/22/2022  Prepared by: Grace Moment    Exercises  Clamshell - 1 x daily - 7 x weekly - 2 sets - 10 reps  Sidelying Reverse Clamshell - 1 x daily - 7 x weekly - 2 sets - 10 reps  Side Stepping with Resistance at Ankles - 1 x daily - 7 x weekly - 2 sets - 10 reps  Supine Transversus Abdominis Bracing - Hands on Stomach - 1 x daily - 7 x weekly - 1 sets - 5 reps - 10 hold      Therapeutic Exercise and NMR EXR  [x] (77106) Provided verbal/tactile cueing for activities related to strengthening, flexibility, endurance, ROM  for improvements in proximal hip and core control with self care, mobility, lifting and ambulation.  [] (98352) Provided verbal/tactile cueing for activities related to improving balance, coordination, kinesthetic sense, posture, motor skill, proprioception  to assist with core control in self care, mobility, lifting, and ambulation.   [] (51871) Therapist is in constant attendance of 2 or more patients providing skilled therapy interventions, but not providing any significant amount of measurable one-on-one time to either patient, for improvements in LE, proximal hip, and core control in self care, mobility, lifting, ambulation and eccentric single leg control.      Therapeutic Activities:    [x] (47442 or 59425) Provided verbal/tactile cueing for activities related to improving balance, coordination, kinesthetic sense, posture, motor skill, proprioception and motor activation to allow for proper function  with self care and ADLs  [] (46502) Provided training and instruction to the patient for proper core and proximal hip recruitment and positioning with ambulation re-education     Home Exercise Program:    [x] (20614) Reviewed/Progressed HEP activities related to strengthening, flexibility, endurance, ROM of core, proximal hip and LE for functional self-care, mobility, lifting and ambulation   [] (71943) Reviewed/Progressed HEP activities related to improving balance, coordination, kinesthetic sense, posture, motor skill, proprioception of core, proximal hip and LE for self care, mobility, lifting, and ambulation      Manual Treatments:  PROM / STM / Oscillations-Mobs:  G-I, II, III, IV (PA's, Inf., Post.)  [] (76013) Provided manual therapy to mobilize proximal hip and LS spine soft tissue/joints for the purpose of modulating pain, promoting relaxation,  increasing ROM, reducing/eliminating soft tissue swelling/inflammation/restriction, improving soft tissue extensibility and allowing for proper ROM for normal function with self care, mobility, lifting and ambulation. Charges:  Timed Code Treatment Minutes: 45   Total Treatment Minutes: 45       [] EVAL - LOW (15794)   [] EVAL - MOD (84893)  [] EVAL - HIGH (15589)  [] RE-EVAL (77316)  [x] VL(29589) x 1     [] Ionto  [x] NMR (54469) x 1    [] Vaso  [x] Manual (15991) x 1     [] Ultrasound  [] TA x       [] Mech Traction (70860)  [] GaitTraining x     [] ES (un) (90386):   [] Home Management Training [] ES(attended) (32782)             [] Aquatics    [] Group  [] Other    Goals:   Patient stated goal: decrease pain. []? Progressing: []? Met: []? Not Met: []? Adjusted     Therapist goals for Patient:   Short Term Goals: To be achieved in: 2 weeks  1. Independent in HEP and progression per patient tolerance, in order to prevent re-injury. []? Progressing: []? Met: []? Not Met: []? Adjusted  2. Patient will have a decrease in pain to facilitate improvement in movement, function, and ADLs as indicated by Functional Deficits. []? Progressing: []? Met: []? Not Met: []? Adjusted     Long Term Goals: To be achieved in: 6 weeks  1. Disability index score of 10% or less for the SPEEDY to assist with reaching prior level of function. []? Progressing: []? Met: []? Not Met: []? Adjusted  2. Patient will demonstrate increased AROM to WNL, good LS mobility, good hip ROM to allow for proper joint functioning as indicated by patients Functional Deficits.    []? Progressing: []? Met: []? Not Met: []? Adjusted  3. Patient will demonstrate an increase in Strength to good proximal hip and core activation to allow for proper functional mobility as indicated by patients Functional Deficits. []? Progressing: []? Met: []? Not Met: []? Adjusted  4. Patient will return to functional activities including sitting for 1 hour without increased symptoms or restriction when returning to standing. []? Progressing: []? Met: []? Not Met: []? Adjusted  5. Pt will report pain decreased to 3/10 at worst for improved activity tolerance and functional mobility. []? Progressing: []? Met: []? Not Met: []? Adjusted       Overall Progression Towards Functional goals/ Treatment Progress Update:  [] Patient is progressing as expected towards functional goals listed. [] Progression is slowed due to complexities/Impairments listed. [] Progression has been slowed due to co-morbidities. [x] Plan just implemented, too soon to assess goals progression <30days   [] Goals require adjustment due to lack of progress  [] Patient is not progressing as expected and requires additional follow up with physician  [] Other    Persisting Functional Limitations/Impairments:  [x]Sitting [x]Standing   [x]Walking [x]Squatting/bending    [x]Stairs [x]ADL's    [x]Transfers [x]Reaching  [x]Housework [x]Job related tasks  [x]Driving [x]Sports/Recreation   []Sleeping []Other:    ASSESSMENT:  Pt continues to report R lateral hip pain, only is able to be reproduced with hip abd strengthening such as during lateral band walks (minimal inc in pain) and sidelying abd, but pt tolerates sidelying clams and reverse clams without reproduction. No TTP as noted in objective section above. Discussed likelihood of symptoms of low back origin vs greater trochanteric pain syndrome, will continue to focus on pelvic hypomobilities and general strengthening and see if this resolves lateral hip symptoms as well.   Pt would benefit from continued skilled PT to further address listed deficits for return to PLOF and dec pain with transfers, standing, stairs, and walking to allow for full work tasks. Treatment/Activity Tolerance:  [x] Patient able to complete tx  [] Patient limited by fatique  [] Patient limited by pain  [] Patient limited by other medical complications  [] Other:     Prognosis: [x] Good [x] Fair  [] Poor    Patient Requires Follow-up: [x] Yes  [] No    PLAN: See eval. PT 1-2x / week for 6 weeks. pt can only come on tues/wed and has 25$ co-pay so will plan for 1x/week in clinic with focus on HEP  [x] Continue per plan of care [] Alter current plan (see comments)  [] Plan of care initiated [] Hold pending MD visit [] Discharge    Electronically signed by: Marni Vidal, PT, DPT      Note: If patient does not return for scheduled/ recommended follow up visits, this note will serve as a discharge from care along with most recent update on progress.

## 2022-03-09 ENCOUNTER — HOSPITAL ENCOUNTER (OUTPATIENT)
Dept: PHYSICAL THERAPY | Age: 67
Setting detail: THERAPIES SERIES
Discharge: HOME | End: 2022-03-09
Payer: COMMERCIAL

## 2022-03-09 PROCEDURE — 97110 THERAPEUTIC EXERCISES: CPT

## 2022-03-09 PROCEDURE — 97140 MANUAL THERAPY 1/> REGIONS: CPT

## 2022-03-09 NOTE — FLOWSHEET NOTE
[address] [facility]  Phone: [phone]   Fax: [phone]    Physical Therapy Treatment Note/ Progress Report:     Date:  3/9/2022    Patient Name:  Edilberto Lunsford    :  1955  MRN: [id number]  Restrictions/Precautions:    Medical/Treatment Diagnosis Information:  · Diagnosis: M54.50, G89.29 (ICD-10-CM) - Chronic midline low back pain without sciatica  · Treatment Diagnosis: pain with end range lumbar AROM, decreased hip flexion and hip abduction strength, decreased core strength, gait impairments. Insurance/Certification information:  PT Insurance Information: [facility] Choice Plus  Physician Information:  Referring Practitioner: Taco Harry MD  Plan of care signed (Y/N): []  Yes [x]  No    Date of Patient follow up with Physician:      Progress Report: []  Yes  [x]  No     Date Range for reporting period:  Beginnin/15/22  PN:  Ending:     Progress report due (10 Rx/or 30 days whichever is less): visit #10 or      Recertification due (POC duration/ or 90 days whichever is less): visit #10 or 5/15/22 (date)     Visit # Insurance Allowable Auth required? Date Range   3/10 20 visits, $25 co-pay []  Yes  [x]  No NA       Latex Allergy:  [x]NO      []YES  Preferred Language for Healthcare:   [x]English       []other:    Functional Scale:        Date assessed:  SPEEDY: raw score =8 ; dysfunction = 16%    2/15/22    Pain level:  2/10     SUBJECTIVE:  Pt reports that therapy is helping a little, nothing new to report since last session. Feels like the back may been feeling a little better but the hip may be getting a little worse.      OBJECTIVE:   3/1: unable to reproduce lateral hip pain with palpation of lateral thigh and lateral hip musculature, no TTP over greater trochanter - discussed likelihood of lateral hip pain being referred from low back, but will continue to monitor      RESTRICTIONS/PRECAUTIONS: none    Exercises/Interventions:     Therapeutic Exercise (53412) Resistance / level Sets / Seconds Reps Notes / Cues          Bike/seated stepper Lv4 4'  s=9   HSS  HFS  30\"   2 B   2/22: pt does not feel L HFS, therefore performed on R only   ITB stretching  2 x 30\" R     SLR  2 10    SL hip abd  2/22: inc R lateral hip pain   SL hip ER and IR (clams, reverse clams)  2 10 R ea 2/22   SL hip abd iso with concentric flexion/ext       Hooklying hip abd/ER iso with belt around lower thighs  3/1          TG squats              Supine clamshell iso blue 10\" 10           LTR       [facility]              Therapeutic Activities (75674)       Mat taps High/mod                                   Neuromuscular Re-ed (90109)       TrA progressions:  - TrA set  - TrA + march  - TrA + BKFO      1  1     10 B  10 B 2/22                 Tandem balance       Monster walks  Lateral walks [place]     2/22: monster walks inc R lateral hip pain   Wall falls       Lunge excursions              Manual Intervention (24327)       Prone PA mobs Grade IV      STM R lumbar paraspinal, quadratus lumborum, glute med       Long axis hip distraction              PA over R PSIS in prone  - STM pre and post PA's to reduce muscle tension for improved tolerance to PA's Gr IV 6x10\"  10' W/ oscillations 2/22: reproduced pt's pain initially, improved with subsequent mobilizations   PA over R PSIS in prone with bias into R sidebending    3/1   PA lumbar spine Gr IV 4 x 10 reps     STM R lateral thigh and R hip musculature    3/1: pt without tenderness in area/no reproduction of symptoms, therefore discontinued after 2'            Modalities:     Pt. Education:  -pt educated on diagnosis, prognosis and expectations for rehab  -all pt questions were answered    Home Exercise Program:  For NV consider: laying hip ITB stretch, SLR, SL hip abd, lateral stepping with band    Access Code: [email or web address]  URL: [email or web address]  Date: 02/22/2022  Prepared by: Casper Duane    Exercises  Clamshell - 1 x daily - 7 x weekly - 2 sets - 10 reps  Sidelying Reverse Clamshell - 1 x daily - 7 x weekly - 2 sets - 10 reps  Side Stepping with Resistance at Ankles - 1 x daily - 7 x weekly - 2 sets - 10 reps  Supine Transversus Abdominis Bracing - Hands on Stomach - 1 x daily - 7 x weekly - 1 sets - 5 reps - 10 hold      Therapeutic Exercise and NMR EXR  [x] (45422) Provided verbal/tactile cueing for activities related to strengthening, flexibility, endurance, ROM  for improvements in proximal hip and core control with self care, mobility, lifting and ambulation.  [] (27059) Provided verbal/tactile cueing for activities related to improving balance, coordination, kinesthetic sense, posture, motor skill, proprioception  to assist with core control in self care, mobility, lifting, and ambulation.   [] (01829) Therapist is in constant attendance of 2 or more patients providing skilled therapy interventions, but not providing any significant amount of measurable one-on-one time to either patient, for improvements in LE, proximal hip, and core control in self care, mobility, lifting, ambulation and eccentric single leg control.      Therapeutic Activities:    [x] (76805 or 65331) Provided verbal/tactile cueing for activities related to improving balance, coordination, kinesthetic sense, posture, motor skill, proprioception and motor activation to allow for proper function  with self care and ADLs  [] (19625) Provided training and instruction to the patient for proper core and proximal hip recruitment and positioning with ambulation re-education     Home Exercise Program:    [x] (18106) Reviewed/Progressed HEP activities related to strengthening, flexibility, endurance, ROM of core, proximal hip and LE for functional self-care, mobility, lifting and ambulation   [] (60389) Reviewed/Progressed HEP activities related to improving balance, coordination, kinesthetic sense, posture, motor skill, proprioception of core, proximal hip and LE for self care, mobility, lifting, and ambulation      Manual Treatments:  PROM / STM / Oscillations-Mobs:  G-I, II, III, IV (PA's, Inf., Post.)  [] (45848) Provided manual therapy to mobilize proximal hip and LS spine soft tissue/joints for the purpose of modulating pain, promoting relaxation,  increasing ROM, reducing/eliminating soft tissue swelling/inflammation/restriction, improving soft tissue extensibility and allowing for proper ROM for normal function with self care, mobility, lifting and ambulation. Charges:  Timed Code Treatment Minutes: 45   Total Treatment Minutes: 45       [] EVAL - LOW (67050)   [] EVAL - MOD (15551)  [] EVAL - HIGH (72985)  [] RE-EVAL (43183)  [x] FD(38235) x 2    [] Ionto  [] NMR (06662) x 1    [] Vaso  [x] Manual (92250) x 1     [] Ultrasound  [] TA x       [] Mech Traction (46978)  [] GaitTraining x     [] ES (un) (93096):   [] Home Management Training [] ES(attended) (58818)             [] Aquatics    [] Group  [] Other    Goals:   Patient stated goal: decrease pain. []? Progressing: []? Met: []? Not Met: []? Adjusted     Therapist goals for Patient:   Short Term Goals: To be achieved in: 2 weeks  1. Independent in HEP and progression per patient tolerance, in order to prevent re-injury. []? Progressing: []? Met: []? Not Met: []? Adjusted  2. Patient will have a decrease in pain to facilitate improvement in movement, function, and ADLs as indicated by Functional Deficits. []? Progressing: []? Met: []? Not Met: []? Adjusted     Long Term Goals: To be achieved in: 6 weeks  1. Disability index score of 10% or less for the SPEEDY to assist with reaching prior level of function. []? Progressing: []? Met: []? Not Met: []? Adjusted  2. Patient will demonstrate increased AROM to WNL, good LS mobility, good hip ROM to allow for proper joint functioning as indicated by patients Functional Deficits. []? Progressing: []? Met: []? Not Met: []? Adjusted  3.  Patient will demonstrate an increase in Strength to good proximal hip and core activation to allow for proper functional mobility as indicated by patients Functional Deficits. []? Progressing: []? Met: []? Not Met: []? Adjusted  4. Patient will return to functional activities including sitting for 1 hour without increased symptoms or restriction when returning to standing. []? Progressing: []? Met: []? Not Met: []? Adjusted  5. Pt will report pain decreased to 3/10 at worst for improved activity tolerance and functional mobility. []? Progressing: []? Met: []? Not Met: []? Adjusted       Overall Progression Towards Functional goals/ Treatment Progress Update:  [] Patient is progressing as expected towards functional goals listed. [] Progression is slowed due to complexities/Impairments listed. [] Progression has been slowed due to co-morbidities. [x] Plan just implemented, too soon to assess goals progression <30days   [] Goals require adjustment due to lack of progress  [] Patient is not progressing as expected and requires additional follow up with physician  [] Other    Persisting Functional Limitations/Impairments:  [x]Sitting [x]Standing   [x]Walking [x]Squatting/bending    [x]Stairs [x]ADL's    [x]Transfers [x]Reaching  [x]Housework [x]Job related tasks  [x]Driving [x]Sports/Recreation   []Sleeping []Other:    ASSESSMENT:  PT reports significant improvement post Sacral PA mobs. Strengthening slowly progressing and is limited by pain. Will continue to progress as able. Treatment/Activity Tolerance:  [x] Patient able to complete tx  [] Patient limited by fatique  [] Patient limited by pain  [] Patient limited by other medical complications  [] Other:     Prognosis: [x] Good [x] Fair  [] Poor    Patient Requires Follow-up: [x] Yes  [] No    PLAN: See eval. PT 1-2x / week for 6 weeks.  pt can only come on tues/wed and has 25$ co-pay so will plan for 1x/week in clinic with focus on HEP  [x] Continue per plan of care [] Elena Bernal current plan (see comments)  [] Plan of care initiated [] Hold pending MD visit [] Discharge    Electronically signed by: Minh Moreno PT, DPT      Note: If patient does not return for scheduled/ recommended follow up visits, this note will serve as a discharge from care along with most recent update on progress.

## 2022-03-15 ENCOUNTER — HOSPITAL ENCOUNTER (OUTPATIENT)
Dept: PHYSICAL THERAPY | Age: 67
Setting detail: THERAPIES SERIES
Discharge: HOME | End: 2022-03-15
Payer: COMMERCIAL

## 2022-03-15 PROCEDURE — 97140 MANUAL THERAPY 1/> REGIONS: CPT

## 2022-03-15 PROCEDURE — 97110 THERAPEUTIC EXERCISES: CPT

## 2022-03-15 NOTE — FLOWSHEET NOTE
[address] [facility]  Phone: [phone]   Fax: [phone]    Physical Therapy Treatment Note/ Progress Report:     Date:  3/15/2022    Patient Name:  Angeles Garcia    :  1955  MRN: [id number]  Restrictions/Precautions:    Medical/Treatment Diagnosis Information:  · Diagnosis: M54.50, G89.29 (ICD-10-CM) - Chronic midline low back pain without sciatica  · Treatment Diagnosis: pain with end range lumbar AROM, decreased hip flexion and hip abduction strength, decreased core strength, gait impairments. Insurance/Certification information:  PT Insurance Information: [place] [employer] Plus  Physician Information:  Referring Practitioner: Herminio Herrmann MD  Plan of care signed (Y/N): []  Yes [x]  No    Date of Patient follow up with Physician:      Progress Report: []  Yes  [x]  No     Date Range for reporting period:  Beginnin/15/22  PN:  Ending:     Progress report due (10 Rx/or 30 days whichever is less): visit #10 or      Recertification due (POC duration/ or 90 days whichever is less): visit #10 or 5/15/22 (date)     Visit # Insurance Allowable Auth required? Date Range   4/10 20 visits, $25 co-pay []  Yes  [x]  No NA       Latex Allergy:  [x]NO      []YES  Preferred Language for Healthcare:   [x]English       []other:    Functional Scale:        Date assessed:  SPEEDY: raw score =8 ; dysfunction = 16%    2/15/22    Pain level:  5/10 with walking and getting up     SUBJECTIVE:  Pt reports back pain has been improving, but now her R lateral hip is hurting worse. Pt is getting ~ 48 hours of relief from therapy. 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      OBJECTIVE:   3/1: unable to reproduce lateral hip pain with palpation of lateral thigh and lateral hip musculature, no TTP over greater trochanter - discussed likelihood of lateral hip pain being referred from low back, but will continue to ITB stretch, SLR, SL hip abd, lateral stepping with band    Access Code: [email or web address]  URL: [email or web address]  Date: 02/22/2022  Prepared by: COINTERRA Huml    Exercises  Clamshell - 1 x daily - 7 x weekly - 2 sets - 10 reps  Sidelying Reverse Clamshell - 1 x daily - 7 x weekly - 2 sets - 10 reps  Side Stepping with Resistance at Ankles - 1 x daily - 7 x weekly - 2 sets - 10 reps  Supine Transversus Abdominis Bracing - Hands on Stomach - 1 x daily - 7 x weekly - 1 sets - 5 reps - 10 hold      Therapeutic Exercise and NMR EXR  [x] (33257) Provided verbal/tactile cueing for activities related to strengthening, flexibility, endurance, ROM  for improvements in proximal hip and core control with self care, mobility, lifting and ambulation.  [] (77672) Provided verbal/tactile cueing for activities related to improving balance, coordination, kinesthetic sense, posture, motor skill, proprioception  to assist with core control in self care, mobility, lifting, and ambulation.   [] (33288) Therapist is in constant attendance of 2 or more patients providing skilled therapy interventions, but not providing any significant amount of measurable one-on-one time to either patient, for improvements in LE, proximal hip, and core control in self care, mobility, lifting, ambulation and eccentric single leg control.      Therapeutic Activities:    [x] (72470 or 76381) Provided verbal/tactile cueing for activities related to improving balance, coordination, kinesthetic sense, posture, motor skill, proprioception and motor activation to allow for proper function  with self care and ADLs  [] (05838) Provided training and instruction to the patient for proper core and proximal hip recruitment and positioning with ambulation re-education     Home Exercise Program:    [x] (75600) Reviewed/Progressed HEP activities related to strengthening, flexibility, endurance, ROM of core, proximal hip and LE for functional self-care, mobility, lifting and ambulation   [] (16416) Reviewed/Progressed HEP activities related to improving balance, coordination, kinesthetic sense, posture, motor skill, proprioception of core, proximal hip and LE for self care, mobility, lifting, and ambulation      Manual Treatments:  PROM / STM / Oscillations-Mobs:  G-I, II, III, IV (PA's, Inf., Post.)  [] (78491) Provided manual therapy to mobilize proximal hip and LS spine soft tissue/joints for the purpose of modulating pain, promoting relaxation,  increasing ROM, reducing/eliminating soft tissue swelling/inflammation/restriction, improving soft tissue extensibility and allowing for proper ROM for normal function with self care, mobility, lifting and ambulation. Charges:  Timed Code Treatment Minutes: 45   Total Treatment Minutes: 45       [] EVAL - LOW (01928)   [] EVAL - MOD (72267)  [] EVAL - HIGH (51329)  [] RE-EVAL (63682)  [x] JU(11673) x 2    [] Ionto  [] NMR (19889) x     [] Vaso  [x] Manual (02158) x 1     [] Ultrasound  [] TA x       [] Mech Traction (35506)  [] GaitTraining x     [] ES (un) (36642):   [] Home Management Training [] ES(attended) (76637)             [] Aquatics    [] Group  [] Other    Goals:   Patient stated goal: decrease pain. []? Progressing: []? Met: []? Not Met: []? Adjusted     Therapist goals for Patient:   Short Term Goals: To be achieved in: 2 weeks  1. Independent in HEP and progression per patient tolerance, in order to prevent re-injury. []? Progressing: []? Met: []? Not Met: []? Adjusted  2. Patient will have a decrease in pain to facilitate improvement in movement, function, and ADLs as indicated by Functional Deficits. []? Progressing: []? Met: []? Not Met: []? Adjusted     Long Term Goals: To be achieved in: 6 weeks  1. Disability index score of 10% or less for the SPEEDY to assist with reaching prior level of function. []? Progressing: []? Met: []? Not Met: []? Adjusted  2.  Patient will demonstrate increased AROM to WNL, good LS mobility, good hip ROM to allow for proper joint functioning as indicated by patients Functional Deficits. []? Progressing: []? Met: []? Not Met: []? Adjusted  3. Patient will demonstrate an increase in Strength to good proximal hip and core activation to allow for proper functional mobility as indicated by patients Functional Deficits. []? Progressing: []? Met: []? Not Met: []? Adjusted  4. Patient will return to functional activities including sitting for 1 hour without increased symptoms or restriction when returning to standing. []? Progressing: []? Met: []? Not Met: []? Adjusted  5. Pt will report pain decreased to 3/10 at worst for improved activity tolerance and functional mobility. []? Progressing: []? Met: []? Not Met: []? Adjusted       Overall Progression Towards Functional goals/ Treatment Progress Update:  [] Patient is progressing as expected towards functional goals listed. [] Progression is slowed due to complexities/Impairments listed. [] Progression has been slowed due to co-morbidities. [x] Plan just implemented, too soon to assess goals progression <30days   [] Goals require adjustment due to lack of progress  [] Patient is not progressing as expected and requires additional follow up with physician  [] Other    Persisting Functional Limitations/Impairments:  [x]Sitting [x]Standing   [x]Walking [x]Squatting/bending    [x]Stairs [x]ADL's    [x]Transfers [x]Reaching  [x]Housework [x]Job related tasks  [x]Driving [x]Sports/Recreation   []Sleeping []Other:    ASSESSMENT:  Discussed benefits and risks to injection in lateral hip to allow for dec pain at work and improved tolerance to PT to progress towards functional goals with less pain. Pt is agreeable and will discuss this with Dr. Bernie Yarbrough at her visit next week.  Pt has MRI scheduled soon and is asking if they can tell what is causing lateral hip pain, though it sounds like the MRI will be for lumbar spine and likely not visualize R lateral hip. Discussed conservative care and if pt is not responding well to conservative care, can refer pt back to MD regarding lateral hip pain. Pt continues to respond well to prone PA to R PSIS. Added strengthening to lateral hip musculature and pt tolerated well. Will continue to progress as able. Treatment/Activity Tolerance:  [x] Patient able to complete tx  [] Patient limited by fatique  [] Patient limited by pain  [] Patient limited by other medical complications  [] Other:     Prognosis: [x] Good [x] Fair  [] Poor    Patient Requires Follow-up: [x] Yes  [] No    PLAN: See eval. PT 1-2x / week for 6 weeks. pt can only come on tues/wed and has 25$ co-pay so will plan for 1x/week in clinic with focus on HEP  [x] Continue per plan of care [] Alter current plan (see comments)  [] Plan of care initiated [] Hold pending MD visit [] Discharge    Electronically signed by: Rip Singh, PT, DPT      Note: If patient does not return for scheduled/ recommended follow up visits, this note will serve as a discharge from care along with most recent update on progress.

## 2022-03-22 ENCOUNTER — HOSPITAL ENCOUNTER (OUTPATIENT)
Dept: PHYSICAL THERAPY | Age: 67
Setting detail: THERAPIES SERIES
Discharge: HOME | End: 2022-03-22
Payer: COMMERCIAL

## 2022-03-22 NOTE — FLOWSHEET NOTE
Physical Therapy  Cancellation/No-show Note  Patient Name:  Ariel Sharma  :  1955   Date:  3/22/2022  Cancelled visits to date: 1  No-shows to date: 0    Patient status for today's appointment patient:  [x]  Cancelled 3/22  []  Rescheduled appointment  []  No-show     Reason given by patient:  []  Patient ill  []  Conflicting appointment  []  No transportation    []  Conflict with work  [x]  No reason given  []  Other:     Comments:      Phone call information:   []  Phone call made today to patient at _ time at number provided:      []  Patient answered, conversation as follows:    []  Patient did not answer, message left as follows:  [x]  Phone call not made today  []  Phone call not needed - pt contacted us to cancel and provided reason for cancellation.      Electronically signed by:  Joshua Cai PT

## 2022-03-23 ENCOUNTER — OFFICE VISIT (OUTPATIENT)
Dept: PRIMARY CARE CLINIC | Age: 67
End: 2022-03-23
Payer: COMMERCIAL

## 2022-03-23 VITALS
BODY MASS INDEX: 43.26 KG/M2 | OXYGEN SATURATION: 99 % | SYSTOLIC BLOOD PRESSURE: 160 MMHG | HEIGHT: 66 IN | TEMPERATURE: 98 F | DIASTOLIC BLOOD PRESSURE: 70 MMHG | HEART RATE: 74 BPM | WEIGHT: 269.2 LBS

## 2022-03-23 DIAGNOSIS — I71.20 THORACIC AORTIC ANEURYSM WITHOUT RUPTURE: ICD-10-CM

## 2022-03-23 DIAGNOSIS — I10 ESSENTIAL HYPERTENSION, BENIGN: ICD-10-CM

## 2022-03-23 DIAGNOSIS — M54.50 CHRONIC MIDLINE LOW BACK PAIN WITHOUT SCIATICA: Primary | ICD-10-CM

## 2022-03-23 DIAGNOSIS — K21.9 GASTROESOPHAGEAL REFLUX DISEASE WITHOUT ESOPHAGITIS: ICD-10-CM

## 2022-03-23 DIAGNOSIS — G89.29 CHRONIC MIDLINE LOW BACK PAIN WITHOUT SCIATICA: Primary | ICD-10-CM

## 2022-03-23 PROCEDURE — 1090F PRES/ABSN URINE INCON ASSESS: CPT | Performed by: FAMILY MEDICINE

## 2022-03-23 PROCEDURE — G8484 FLU IMMUNIZE NO ADMIN: HCPCS | Performed by: FAMILY MEDICINE

## 2022-03-23 PROCEDURE — 3017F COLORECTAL CA SCREEN DOC REV: CPT | Performed by: FAMILY MEDICINE

## 2022-03-23 PROCEDURE — G8417 CALC BMI ABV UP PARAM F/U: HCPCS | Performed by: FAMILY MEDICINE

## 2022-03-23 PROCEDURE — G8427 DOCREV CUR MEDS BY ELIG CLIN: HCPCS | Performed by: FAMILY MEDICINE

## 2022-03-23 PROCEDURE — G8399 PT W/DXA RESULTS DOCUMENT: HCPCS | Performed by: FAMILY MEDICINE

## 2022-03-23 PROCEDURE — 99213 OFFICE O/P EST LOW 20 MIN: CPT | Performed by: FAMILY MEDICINE

## 2022-03-23 PROCEDURE — G9899 SCRN MAM PERF RSLTS DOC: HCPCS | Performed by: FAMILY MEDICINE

## 2022-03-23 PROCEDURE — 4004F PT TOBACCO SCREEN RCVD TLK: CPT | Performed by: FAMILY MEDICINE

## 2022-03-23 PROCEDURE — 1123F ACP DISCUSS/DSCN MKR DOCD: CPT | Performed by: FAMILY MEDICINE

## 2022-03-23 PROCEDURE — 4040F PNEUMOC VAC/ADMIN/RCVD: CPT | Performed by: FAMILY MEDICINE

## 2022-03-23 RX ORDER — PREDNISONE 10 MG/1
TABLET ORAL
COMMUNITY
Start: 2022-03-22 | End: 2022-06-09 | Stop reason: ALTCHOICE

## 2022-03-23 RX ORDER — OMEPRAZOLE 20 MG/1
20 CAPSULE, DELAYED RELEASE ORAL DAILY
Qty: 90 CAPSULE | Refills: 3 | Status: SHIPPED | OUTPATIENT
Start: 2022-03-23 | End: 2022-09-26 | Stop reason: SDUPTHER

## 2022-03-23 NOTE — PROGRESS NOTES
Chief Complaint   Patient presents with    Follow-up     6 wks back pain. Subjective:       Matt Mcdaniel is a 77 y.o. female for 6 weeks follow-up on her back pain. Was sent to physical therapy and completed 5 sessions, noticed improvement specially after receiving steroids yesterday due to IV contrast reaction. Had a chest CT as a follow-up on her thoracic aneurysm and has gone up from 4.4 to 4.7 cm. She has a follow-up appointment with her cardiologist next week and probably discuss referral to thoracic surgeon. Patient denies any chest pains, shortness of breath, abdominal pain, melena medication. Patient had a follow-up appointment with her cardiologist, Dr. Ernie Verduzco on February 17, 2022, sent for chest CT and carotid Dopplers follow-up on her thoracic aortic aneurysm and carotid stenosis. Past Medical History:   Diagnosis Date    Aneurysm of thoracic aorta ([facility] [address] 03/22/2022    Increase from 4.4 cm to 4.7 cm    Arthritis     knees    COVID-19 virus infection 03/08/2021    Received convalescent plasma    Deep vein thrombosis of left femoral vein ([address] 06/10/2017    Started Xarelto    Depression 2003    with anxiety. Stable on Lamictal and Effexor    Diabetes mellitus ([address] 11/06/2019    Hemoglobin A1c 6.6%, diet controlled    GI bleed 05/20/2021    hemoglobin 6.0. Received 2 units of packed RBC.  High cholesterol     History of chickenpox     History of DVT (deep vein thrombosis) 2012    Hypertension 09/23/2020    Started lisinopril    Obesity     Pulmonary embolism ([address] 06/10/2017    Sleep apnea, unspecified     Sudden visual loss of left eye 06/26/2017    ESR 77, possible temporal arteritis but normal temporal artery biopsy     Past Surgical History:   Procedure Laterality Date    CARDIAC CATHETERIZATION  08/10/2021    Nonobstructive CAD, mid LAD 40%, mid RCA 20 to 30% and distal RCA 50%. Recommended medical management.   Performed by Dr. Carmita Milton COLONOSCOPY  04/02/2013    Done by Dr. Bridget Frias recommended 10-year follow-up    COLONOSCOPY  08/09/2021    Due to GI bleed, done by Dr. Silver Damian had polyp removed, recommended 3-year follow-up.  HYSTERECTOMY, TOTAL ABDOMINAL  2003    OTHER SURGICAL HISTORY  06/29/2017    temporal artery bx    OVARY REMOVAL Bilateral     Age 48    UPPER GASTROINTESTINAL ENDOSCOPY  08/09/2021    Esophageal ulcer with esophageal stricture done by Dr. Alberto Joseph       Social History     Tobacco Use    Smoking status: Current Every Day Smoker     Packs/day: 1.00     Years: 50.00     Pack years: 50.00     Types: Cigarettes     Start date: 1970    Smokeless tobacco: Never Used   Substance Use Topics    Alcohol use: No     Current Outpatient Medications   Medication Sig Dispense Refill    predniSONE (DELTASONE) 10 MG tablet Take 5 tabs daily x 2 days, then 4 tabs daily x 2 days, then 3 tabs daily x 2 days, then 2 tabs daily x 2 days, then 1 tab daily x 2 days.  omeprazole (PRILOSEC) 20 MG delayed release capsule Take 1 capsule by mouth Daily 90 capsule 3    losartan (COZAAR) 50 MG tablet Take 1 tablet by mouth 2 times daily 180 tablet 3    atorvastatin (LIPITOR) 40 MG tablet Take 1 tablet by mouth nightly 90 tablet 3    venlafaxine (EFFEXOR) 100 MG tablet Take 1 tablet by mouth 3 times daily 270 tablet 3    lamoTRIgine (LAMICTAL) 200 MG tablet Take 1 tablet by mouth daily 90 tablet 3    furosemide (LASIX) 40 MG tablet Take 40 mg by mouth daily       pantoprazole (PROTONIX) 40 MG tablet Take 40 mg by mouth daily       ferrous sulfate 134 MG TABS Take 325 mg by mouth daily       rivaroxaban (XARELTO) 20 MG TABS tablet Take 1 tablet by mouth daily (with breakfast) 90 tablet 3     No current facility-administered medications for this visit.       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- Hep Associated Ab = 0.744 (ref 0.399), PIFA Hep/PF4: Positive per hospital records       Objective:   BP (!) 160/70 (Site: Right Upper Arm, Position: Sitting, Cuff Size: Large Adult)   Pulse 74   Temp 98 °F (36.7 °C)   Ht 5' 5.6\" (1.666 m)   Wt 269 lb 3.2 oz (122.1 kg)   SpO2 99%   BMI 43.98 kg/m²   Alert oriented, NAD, ambulatory, gait : normal  HEENT: unremarkable  Chest/Lungs: clear to ausculation, no wheezing/rales  Heart: RR, normal S1S2, no murmur  Back: good range of motion, (+) paralumbar tenderness, SLR (-)  Extremities: good ROM, good pulses  Neurologic: grossly normal, DTR 2+ bilateral    Assessment/Plan:   1. Chronic midline low back pain without sciatica  Improved with physical therapy. Continue home exercises. Apply ice as needed. Tylenol as needed since patient cannot take NSAID due to Xarelto. Continue to work on weight loss. 2. Essential hypertension, benign  Elevated at 160/70. Need to keep the blood pressure under 140/90. Monitor blood pressure at home. Continue losartan 50 mg twice a day. 3. Gastroesophageal reflux disease without esophagitis  Refilled omeprazole as requested. - omeprazole (PRILOSEC) 20 MG delayed release capsule; Take 1 capsule by mouth Daily  Dispense: 90 capsule; Refill: 3    4. Thoracic aortic aneurysm without rupture [facility])  Keep appointment with her cardiologist next week. Current size is 4.7 cm. Return in about 3 months (around 6/23/2022) for HTN/cholesterol. Electronically Signed: Electronically signed by Alexa Michel MD on 3/23/2022 at 11:09 AM EDT     This dictation was generated by voice recognition computer software. Although all attempts are made to edit the dictation for accuracy, there may be errors in the transcription that are not intended.

## 2022-03-29 ENCOUNTER — APPOINTMENT (OUTPATIENT)
Dept: PHYSICAL THERAPY | Age: 67
End: 2022-03-29
Payer: COMMERCIAL

## 2022-05-13 ENCOUNTER — TELEPHONE (OUTPATIENT)
Dept: PRIMARY CARE CLINIC | Age: 67
End: 2022-05-13

## 2022-05-13 NOTE — TELEPHONE ENCOUNTER
[facility] that we do not have any appts sooner or cancellations and that Dr. Renuka Morgan will be out of town the end of may and will not be back till 6/7/22 that if we can help her with anything else or has any questions to please call and let us know

## 2022-05-13 NOTE — TELEPHONE ENCOUNTER
----- Message from Clarice Carbajal sent at 5/13/2022 12:20 PM EDT -----  Subject: Appointment Request    Reason for Call: Routine Existing Condition Follow Up    QUESTIONS  Type of Appointment? Established Patient  Reason for appointment request? Available appointments did not meet   patient need  Additional Information for Provider? Patient would like to see if there   are any cancellations before scheduled appt 6/9 where she can be seen. Please contact patient to advise. Appts in system are later in month of   Felicia.  ---------------------------------------------------------------------------  --------------  [address]  What is the best way for the office to contact you? OK to leave message on   voicemail  Preferred Call Back Phone Number? [phone]  ---------------------------------------------------------------------------  --------------  SCRIPT ANSWERS  Relationship to Patient? Self  Have your symptoms changed? No  Is this follow up request related to routine Diabetes Management? No  Have you been diagnosed with, awaiting test results for, or told that you   are suspected of having COVID-19 (Coronavirus)? (If patient has tested   negative or was tested as a requirement for work, school, or travel and   not based on symptoms, answer no)? No  Within the past 10 days have you developed any of the following symptoms   (answer no if symptoms have been present longer than 10 days or began   more than 10 days ago)? Fever or Chills, Cough, Shortness of breath or   difficulty breathing, Loss of taste or smell, Sore throat, Nasal   congestion, Sneezing or runny nose, Fatigue or generalized body aches   (answer no if pain is specific to a body part e.g. back pain), Diarrhea,   Headache? No  Have you had close contact with someone with COVID-19 in the last 7 days? No  (Service Expert  click yes below to proceed with [facility] As Usual   Scheduling)?  Yes

## 2022-06-09 ENCOUNTER — OFFICE VISIT (OUTPATIENT)
Dept: PRIMARY CARE CLINIC | Age: 67
End: 2022-06-09
Payer: MEDICARE

## 2022-06-09 VITALS
HEART RATE: 69 BPM | WEIGHT: 271 LBS | OXYGEN SATURATION: 97 % | BODY MASS INDEX: 43.55 KG/M2 | SYSTOLIC BLOOD PRESSURE: 128 MMHG | HEIGHT: 66 IN | TEMPERATURE: 97.1 F | DIASTOLIC BLOOD PRESSURE: 82 MMHG

## 2022-06-09 DIAGNOSIS — G47.33 OSA (OBSTRUCTIVE SLEEP APNEA): ICD-10-CM

## 2022-06-09 DIAGNOSIS — D50.0 IRON DEFICIENCY ANEMIA DUE TO CHRONIC BLOOD LOSS: ICD-10-CM

## 2022-06-09 DIAGNOSIS — F41.8 DEPRESSION WITH ANXIETY: ICD-10-CM

## 2022-06-09 DIAGNOSIS — I10 ESSENTIAL HYPERTENSION, BENIGN: Primary | ICD-10-CM

## 2022-06-09 DIAGNOSIS — E78.2 MIXED HYPERLIPIDEMIA: ICD-10-CM

## 2022-06-09 DIAGNOSIS — E11.9 DIET-CONTROLLED DIABETES MELLITUS (HCC): ICD-10-CM

## 2022-06-09 PROCEDURE — 1123F ACP DISCUSS/DSCN MKR DOCD: CPT | Performed by: FAMILY MEDICINE

## 2022-06-09 PROCEDURE — 99214 OFFICE O/P EST MOD 30 MIN: CPT | Performed by: FAMILY MEDICINE

## 2022-06-09 ASSESSMENT — PATIENT HEALTH QUESTIONNAIRE - PHQ9
3. TROUBLE FALLING OR STAYING ASLEEP: 0
8. MOVING OR SPEAKING SO SLOWLY THAT OTHER PEOPLE COULD HAVE NOTICED. OR THE OPPOSITE, BEING SO FIGETY OR RESTLESS THAT YOU HAVE BEEN MOVING AROUND A LOT MORE THAN USUAL: 0
SUM OF ALL RESPONSES TO PHQ QUESTIONS 1-9: 0
SUM OF ALL RESPONSES TO PHQ QUESTIONS 1-9: 0
SUM OF ALL RESPONSES TO PHQ9 QUESTIONS 1 & 2: 0
10. IF YOU CHECKED OFF ANY PROBLEMS, HOW DIFFICULT HAVE THESE PROBLEMS MADE IT FOR YOU TO DO YOUR WORK, TAKE CARE OF THINGS AT HOME, OR GET ALONG WITH OTHER PEOPLE: 0
SUM OF ALL RESPONSES TO PHQ QUESTIONS 1-9: 0
2. FEELING DOWN, DEPRESSED OR HOPELESS: 0
1. LITTLE INTEREST OR PLEASURE IN DOING THINGS: 0
5. POOR APPETITE OR OVEREATING: 0
SUM OF ALL RESPONSES TO PHQ QUESTIONS 1-9: 0
7. TROUBLE CONCENTRATING ON THINGS, SUCH AS READING THE NEWSPAPER OR WATCHING TELEVISION: 0
6. FEELING BAD ABOUT YOURSELF - OR THAT YOU ARE A FAILURE OR HAVE LET YOURSELF OR YOUR FAMILY DOWN: 0
9. THOUGHTS THAT YOU WOULD BE BETTER OFF DEAD, OR OF HURTING YOURSELF: 0
4. FEELING TIRED OR HAVING LITTLE ENERGY: 0

## 2022-06-09 NOTE — PROGRESS NOTES
Chief Complaint   Patient presents with    Follow-up    Hypertension    Cholesterol Problem       Subjective:   Mily Acosta is a 77 y.o. female here for 6 month follow-up with known hypertension, hyperlipidemia, diabetes type 2 (diet-controlled), chronic DVT (on Xarelto) for anticoagulation), SABI (not on CPAP) and depression. Compliant with current medication. Back pain has improved and so far no flareup. Usually doing well on Lamictal and Effexor. Denies any chest pains, shortness of breath, melena hematochezia. Will go on vacation for 2 months in [place], leaving this weekend. Patient still smokes three-quarter pack a day and she will try to quit again. Patient goes to Dr. Randell Mcclendon, cardiologist, for follow-up and surveillance of her thoracic aneurysm. Had CTA of the neck and did not show significant carotid stenosis. Reviewed blood test done on October 27, 2021: Total cholesterol 163, triglyceride 85, HDL 88, LDL 58, hemoglobin 10.5, hematocrit 33.1, WBC 5.9, sodium 139, potassium 4.3, creatinine 0.7, calcium 9.1, ALT 14, AST 30, ferritin level 61.3, hemoglobin A1c 5.0%, TSH 2.17, free T4 0.8    Current Outpatient Medications   Medication Sig Dispense Refill    omeprazole (PRILOSEC) 20 MG delayed release capsule Take 1 capsule by mouth Daily 90 capsule 3    losartan (COZAAR) 50 MG tablet Take 1 tablet by mouth 2 times daily 180 tablet 3    atorvastatin (LIPITOR) 40 MG tablet Take 1 tablet by mouth nightly 90 tablet 3    venlafaxine (EFFEXOR) 100 MG tablet Take 1 tablet by mouth 3 times daily 270 tablet 3    lamoTRIgine (LAMICTAL) 200 MG tablet Take 1 tablet by mouth daily 90 tablet 3    furosemide (LASIX) 40 MG tablet Take 40 mg by mouth daily       ferrous sulfate 134 MG TABS Take 325 mg by mouth daily       rivaroxaban (XARELTO) 20 MG TABS tablet Take 1 tablet by mouth daily (with breakfast) 90 tablet 3     No current facility-administered medications for this visit.       Allergies Allergen Reactions    Heparin Other (See Comments)     Heparin induced thrombocytopenia confirmed at the [place] [address] 11/2009- Hep Associated Ab = 0.744 (ref 0.399), PIFA Hep/PF4: Positive per hospital records    Iodides Hives and Itching     Swollen eyes, pt had reaction with premedication,  NO MORE DYE PER RADIOLOGIST. Hypertension ROS: taking medications as instructed, no medication side effects noted, no TIA's, no chest pain on exertion, no dyspnea on exertion, no swelling of ankles. Objective:   /82 (Site: Left Upper Arm, Position: Sitting, Cuff Size: Large Adult)   Pulse 69   Temp 97.1 °F (36.2 °C) (Infrared)   Ht 5' 5.59\" (1.666 m)   Wt 271 lb (122.9 kg)   SpO2 97%   BMI 44.29 kg/m²    Appearance alert, well appearing, and in no distress. General exam BP noted to be well controlled today in office, S1, S2 normal, no gallop, no murmur, chest clear, no JVD, no HSM, no edema. Foot Exam: Date completed: 06/09/22   Sensation to 10 gram monofilament:  right foot: intact   left foot: intact  Vibratory sensation: intact  Skin intact: Yes  Temperature sensation: clubbing and acrocyanosis noted  Onychomycosis: present  Callous: present  Deformities: None    Assessment/Plan:    1. Essential hypertension, benign  Blood pressure stable at 128/82, goal is to keep it under 140/90. Continue losartan 50 mg twice a day and watch salt intake. Continue to work on weight loss and encouraged to get smoking.  - Comprehensive Metabolic Panel; Future    2. Mixed hyperlipidemia  On Lipitor 40 mg at night. Goal of LDL to keep it under 80. Last LDL was 58. - Lipid Panel; Future  - Comprehensive Metabolic Panel; Future    3. Depression with anxiety  Stable on Lamictal and Effexor. 4. Diet-controlled diabetes mellitus (HCC)  Last hemoglobin A1c was 5%. More likely was steroid induced, took for temporal arteritis. Continue to work on diet, weight loss and exercise.   As long as she keeps under 7% no medication needed at this time. - Hemoglobin A1C; Future  - Comprehensive Metabolic Panel; Future  -  DIABETES FOOT EXAM    5. Iron deficiency anemia due to chronic blood loss  Had seen Dr. Compa Santana in the past, received IV iron infusion. Currently on ferrous sulfate. If remains low might need to see Dr. Compa Santana again.  - CBC with Auto Differential; Future    6. SABI (obstructive sleep apnea)  Not on CPAP. Had sleep study done 15 years ago. Patient interested to get reevaluated but not at this time, may be next visit. 7. Gastroesophageal reflux disease without esophagitis  Stable on omeprazole 20 mg daily     8. Thoracic aortic aneurysm without rupture (HCC)  Continue to follow-up with Dr. Barry Mcqueen. Current size is 4.7 cm. On 03/22/2022.    9.  BMI over 44%. Patient plans to work on the weight loss    10. Chronic/recurrent DVT/PE  On Xarelto for anticoagulation.     Return in about 6 months (around 12/14/2022) for Medicare AWV. Will need pneumonia vaccine and she is considering lung cancer screening. Electronically signed by Angelica Dewitt MD on 6/9/2022 at 9:43 AM EDT     This dictation was generated by voice recognition computer software. Although all attempts are made to edit the dictation for accuracy, there may be errors in the transcription that are not intended.

## 2022-08-15 ENCOUNTER — HOSPITAL ENCOUNTER (OUTPATIENT)
Age: 67
Discharge: HOME | End: 2022-08-15
Payer: MEDICARE

## 2022-08-15 DIAGNOSIS — I10 ESSENTIAL HYPERTENSION, BENIGN: ICD-10-CM

## 2022-08-15 DIAGNOSIS — E78.2 MIXED HYPERLIPIDEMIA: ICD-10-CM

## 2022-08-15 DIAGNOSIS — D50.0 IRON DEFICIENCY ANEMIA DUE TO CHRONIC BLOOD LOSS: ICD-10-CM

## 2022-08-15 DIAGNOSIS — E11.9 DIET-CONTROLLED DIABETES MELLITUS (HCC): ICD-10-CM

## 2022-08-15 LAB
A/G RATIO: 1.3 (ref 1.1–2.2)
ALBUMIN SERPL-MCNC: 3.9 G/DL (ref 3.4–5)
ALP BLD-CCNC: 89 U/L (ref 40–129)
ALT SERPL-CCNC: 13 U/L (ref 10–40)
ANION GAP SERPL CALCULATED.3IONS-SCNC: 12 MMOL/L (ref 3–16)
AST SERPL-CCNC: 18 U/L (ref 15–37)
BASOPHILS ABSOLUTE: 0 K/UL (ref 0–0.2)
BASOPHILS RELATIVE PERCENT: 0.4 %
BILIRUB SERPL-MCNC: 0.4 MG/DL (ref 0–1)
BUN BLDV-MCNC: 16 MG/DL (ref 7–20)
CALCIUM SERPL-MCNC: 8.7 MG/DL (ref 8.3–10.6)
CHLORIDE BLD-SCNC: 107 MMOL/L (ref 99–110)
CHOLESTEROL, TOTAL: 162 MG/DL (ref 0–199)
CO2: 23 MMOL/L (ref 21–32)
CREAT SERPL-MCNC: 0.9 MG/DL (ref 0.6–1.2)
EOSINOPHILS ABSOLUTE: 0.1 K/UL (ref 0–0.6)
EOSINOPHILS RELATIVE PERCENT: 2.3 %
GFR AFRICAN AMERICAN: >60
GFR NON-AFRICAN AMERICAN: >60
GLUCOSE BLD-MCNC: 140 MG/DL (ref 70–99)
HCT VFR BLD CALC: 39.8 % (ref 36–48)
HDLC SERPL-MCNC: 62 MG/DL (ref 40–60)
HEMOGLOBIN: 13.3 G/DL (ref 12–16)
LDL CHOLESTEROL CALCULATED: 84 MG/DL
LYMPHOCYTES ABSOLUTE: 1.2 K/UL (ref 1–5.1)
LYMPHOCYTES RELATIVE PERCENT: 21.1 %
MCH RBC QN AUTO: 29.7 PG (ref 26–34)
MCHC RBC AUTO-ENTMCNC: 33.4 G/DL (ref 31–36)
MCV RBC AUTO: 89.1 FL (ref 80–100)
MONOCYTES ABSOLUTE: 0.3 K/UL (ref 0–1.3)
MONOCYTES RELATIVE PERCENT: 5.5 %
NEUTROPHILS ABSOLUTE: 4 K/UL (ref 1.7–7.7)
NEUTROPHILS RELATIVE PERCENT: 70.7 %
PDW BLD-RTO: 15.1 % (ref 12.4–15.4)
PLATELET # BLD: 171 K/UL (ref 135–450)
PMV BLD AUTO: 8.4 FL (ref 5–10.5)
POTASSIUM SERPL-SCNC: 4.2 MMOL/L (ref 3.5–5.1)
RBC # BLD: 4.46 M/UL (ref 4–5.2)
SODIUM BLD-SCNC: 142 MMOL/L (ref 136–145)
TOTAL PROTEIN: 6.8 G/DL (ref 6.4–8.2)
TRIGL SERPL-MCNC: 81 MG/DL (ref 0–150)
VLDLC SERPL CALC-MCNC: 16 MG/DL
WBC # BLD: 5.6 K/UL (ref 4–11)

## 2022-08-15 PROCEDURE — 80053 COMPREHEN METABOLIC PANEL: CPT

## 2022-08-15 PROCEDURE — 85025 COMPLETE CBC W/AUTO DIFF WBC: CPT

## 2022-08-15 PROCEDURE — 80061 LIPID PANEL: CPT

## 2022-08-15 PROCEDURE — 83036 HEMOGLOBIN GLYCOSYLATED A1C: CPT

## 2022-08-15 PROCEDURE — 36415 COLL VENOUS BLD VENIPUNCTURE: CPT

## 2022-08-16 LAB
ANION GAP 4: 6 MMOL/L (ref 7–16)
BASOPHILS # BLD: 0.6 %
BASOPHILS ABSOLUTE: 0 K/UL (ref 0–0.1)
BUN BLDV-MCNC: 17 MG/DL (ref 7–25)
CALCIUM SERPL-MCNC: 9.3 MG/DL (ref 8.6–10.2)
CHLORIDE BLD-SCNC: 107 MMOL/L (ref 98–107)
CO2: 29 MMOL/L (ref 21–31)
CREATININE + EGFR PANEL: 1 MG/DL (ref 0.6–1.2)
EOSINOPHILS ABSOLUTE: 0.2 K/UL (ref 0–0.4)
EOSINOPHILS RELATIVE PERCENT: 2.8 %
ESTIMATED AVERAGE GLUCOSE: 137 MG/DL
GFR CALCULATED: 55 ML/MIN/1.73M2
GFR CALCULATED: > 60 ML/MIN/1.73M2
GLUCOSE: 139 MG/DL (ref 74–109)
HBA1C MFR BLD: 6.4 %
HEMOGLOBIN URINE, QUAL: 14.1 G/DL (ref 12.1–15.8)
LYMPHOCYTES # BLD: 34.5 %
LYMPHOCYTES ABSOLUTE: 2.2 K/UL (ref 0.8–3.6)
MCH RBC QN AUTO: 29.4 PG (ref 28.4–33.4)
MCHC RBC AUTO-ENTMCNC: 33.7 G/DL (ref 31.1–37)
MCV RBC AUTO: 87.4 FL (ref 85–99)
MONOCYTES # BLD: 5.9 %
MONOCYTES ABSOLUTE: 0.4 K/UL (ref 0.3–0.9)
NEUTROPHILS ABSOLUTE: 3.5 K/UL (ref 2–7.3)
NEUTROPHILS/100 LEUKOCYTES: 56.2 %
PDW BLD-RTO: 14.7 % (ref 11.7–15.2)
PLATELET COUNT MANUAL: 181 K/UL (ref 154–393)
POTASSIUM SERPL-SCNC: 3.8 MMOL/L (ref 3.5–5.3)
RBC # BLD: 4.78 M/UL (ref 3.86–5.17)
SODIUM BLD-SCNC: 142 MMOL/L (ref 136–145)
SR HEMATOCRIT: 41.8 % (ref 35.8–46.5)
WBC BLOOD, MANUAL: 6.3 K/UL (ref 4–10.5)

## 2022-09-06 ENCOUNTER — TELEPHONE (OUTPATIENT)
Dept: PRIMARY CARE CLINIC | Age: 67
End: 2022-09-06

## 2022-09-06 DIAGNOSIS — I82.512 CHRONIC DEEP VEIN THROMBOSIS (DVT) OF FEMORAL VEIN OF LEFT LOWER EXTREMITY (HCC): ICD-10-CM

## 2022-09-26 ENCOUNTER — TELEPHONE (OUTPATIENT)
Dept: PRIMARY CARE CLINIC | Age: 67
End: 2022-09-26

## 2022-09-26 DIAGNOSIS — E78.2 MIXED HYPERLIPIDEMIA: ICD-10-CM

## 2022-09-26 DIAGNOSIS — F41.8 DEPRESSION WITH ANXIETY: ICD-10-CM

## 2022-09-26 DIAGNOSIS — I10 ESSENTIAL HYPERTENSION, BENIGN: ICD-10-CM

## 2022-09-26 DIAGNOSIS — K21.9 GASTROESOPHAGEAL REFLUX DISEASE WITHOUT ESOPHAGITIS: ICD-10-CM

## 2022-09-26 RX ORDER — LAMOTRIGINE 200 MG/1
200 TABLET ORAL DAILY
Qty: 90 TABLET | Refills: 3 | Status: SHIPPED | OUTPATIENT
Start: 2022-09-26

## 2022-09-26 RX ORDER — OMEPRAZOLE 20 MG/1
20 CAPSULE, DELAYED RELEASE ORAL DAILY
Qty: 90 CAPSULE | Refills: 3 | Status: SHIPPED | OUTPATIENT
Start: 2022-09-26

## 2022-09-26 RX ORDER — VENLAFAXINE 100 MG/1
100 TABLET ORAL 3 TIMES DAILY
Qty: 270 TABLET | Refills: 3 | Status: SHIPPED | OUTPATIENT
Start: 2022-09-26

## 2022-09-26 RX ORDER — LOSARTAN POTASSIUM 50 MG/1
50 TABLET ORAL 2 TIMES DAILY
Qty: 180 TABLET | Refills: 3 | Status: SHIPPED | OUTPATIENT
Start: 2022-09-26

## 2022-09-26 RX ORDER — ATORVASTATIN CALCIUM 40 MG/1
40 TABLET, FILM COATED ORAL NIGHTLY
Qty: 90 TABLET | Refills: 3 | Status: SHIPPED | OUTPATIENT
Start: 2022-09-26

## 2022-09-26 NOTE — TELEPHONE ENCOUNTER
Pharmacy called requesting refills on this medications for pt. Pt was see on 09/2/22 and this meds were not sent to pharmacy  omeprazole (PRILOSEC) 20 MG delayed release capsule  losartan (COZAAR) 50 MG tablet  atorvastatin (LIPITOR) 40 MG tablet  venlafaxine (EFFEXOR) 100 MG tablet    Ph.  [facility] [phone]

## 2023-01-16 SDOH — HEALTH STABILITY: PHYSICAL HEALTH: ON AVERAGE, HOW MANY DAYS PER WEEK DO YOU ENGAGE IN MODERATE TO STRENUOUS EXERCISE (LIKE A BRISK WALK)?: 2 DAYS

## 2023-01-16 SDOH — HEALTH STABILITY: PHYSICAL HEALTH: ON AVERAGE, HOW MANY MINUTES DO YOU ENGAGE IN EXERCISE AT THIS LEVEL?: 20 MIN

## 2023-01-16 ASSESSMENT — PATIENT HEALTH QUESTIONNAIRE - PHQ9
SUM OF ALL RESPONSES TO PHQ QUESTIONS 1-9: 9
7. TROUBLE CONCENTRATING ON THINGS, SUCH AS READING THE NEWSPAPER OR WATCHING TELEVISION: 0
SUM OF ALL RESPONSES TO PHQ QUESTIONS 1-9: 9
1. LITTLE INTEREST OR PLEASURE IN DOING THINGS: 1
2. FEELING DOWN, DEPRESSED OR HOPELESS: 1
10. IF YOU CHECKED OFF ANY PROBLEMS, HOW DIFFICULT HAVE THESE PROBLEMS MADE IT FOR YOU TO DO YOUR WORK, TAKE CARE OF THINGS AT HOME, OR GET ALONG WITH OTHER PEOPLE: 1
3. TROUBLE FALLING OR STAYING ASLEEP: 1
9. THOUGHTS THAT YOU WOULD BE BETTER OFF DEAD, OR OF HURTING YOURSELF: 0
SUM OF ALL RESPONSES TO PHQ QUESTIONS 1-9: 9
4. FEELING TIRED OR HAVING LITTLE ENERGY: 3
SUM OF ALL RESPONSES TO PHQ9 QUESTIONS 1 & 2: 2
6. FEELING BAD ABOUT YOURSELF - OR THAT YOU ARE A FAILURE OR HAVE LET YOURSELF OR YOUR FAMILY DOWN: 0
SUM OF ALL RESPONSES TO PHQ QUESTIONS 1-9: 9
8. MOVING OR SPEAKING SO SLOWLY THAT OTHER PEOPLE COULD HAVE NOTICED. OR THE OPPOSITE, BEING SO FIGETY OR RESTLESS THAT YOU HAVE BEEN MOVING AROUND A LOT MORE THAN USUAL: 0
5. POOR APPETITE OR OVEREATING: 3

## 2023-01-16 ASSESSMENT — LIFESTYLE VARIABLES
HOW MANY STANDARD DRINKS CONTAINING ALCOHOL DO YOU HAVE ON A TYPICAL DAY: PATIENT DOES NOT DRINK
HOW MANY STANDARD DRINKS CONTAINING ALCOHOL DO YOU HAVE ON A TYPICAL DAY: 0
HOW OFTEN DO YOU HAVE SIX OR MORE DRINKS ON ONE OCCASION: 1
HOW OFTEN DO YOU HAVE A DRINK CONTAINING ALCOHOL: NEVER
HOW OFTEN DO YOU HAVE A DRINK CONTAINING ALCOHOL: 1

## 2023-01-18 ENCOUNTER — OFFICE VISIT (OUTPATIENT)
Dept: PRIMARY CARE CLINIC | Age: 68
End: 2023-01-18
Payer: MEDICARE

## 2023-01-18 VITALS
OXYGEN SATURATION: 97 % | BODY MASS INDEX: 46.32 KG/M2 | SYSTOLIC BLOOD PRESSURE: 122 MMHG | HEIGHT: 65 IN | HEART RATE: 88 BPM | WEIGHT: 278 LBS | DIASTOLIC BLOOD PRESSURE: 72 MMHG

## 2023-01-18 DIAGNOSIS — Z23 NEED FOR INFLUENZA VACCINATION: ICD-10-CM

## 2023-01-18 DIAGNOSIS — I10 ESSENTIAL HYPERTENSION, BENIGN: ICD-10-CM

## 2023-01-18 DIAGNOSIS — E78.2 MIXED HYPERLIPIDEMIA: ICD-10-CM

## 2023-01-18 DIAGNOSIS — Z00.00 MEDICARE ANNUAL WELLNESS VISIT, SUBSEQUENT: Primary | ICD-10-CM

## 2023-01-18 DIAGNOSIS — I82.512 CHRONIC DEEP VEIN THROMBOSIS (DVT) OF FEMORAL VEIN OF LEFT LOWER EXTREMITY (HCC): ICD-10-CM

## 2023-01-18 DIAGNOSIS — E11.9 DIET-CONTROLLED DIABETES MELLITUS (HCC): ICD-10-CM

## 2023-01-18 PROBLEM — I83.893 VARICOSE VEINS OF BILATERAL LOWER EXTREMITIES WITH OTHER COMPLICATIONS: Status: ACTIVE | Noted: 2022-11-20

## 2023-01-18 PROBLEM — I87.2 CHRONIC VENOUS INSUFFICIENCY: Status: ACTIVE | Noted: 2022-11-20

## 2023-01-18 LAB
CREATININE URINE: 334.4 MG/DL (ref 28–259)
MICROALBUMIN UR-MCNC: 10.6 MG/DL
MICROALBUMIN/CREAT UR-RTO: 31.7 MG/G (ref 0–30)

## 2023-01-18 PROCEDURE — 90694 VACC AIIV4 NO PRSRV 0.5ML IM: CPT | Performed by: FAMILY MEDICINE

## 2023-01-18 PROCEDURE — G0439 PPPS, SUBSEQ VISIT: HCPCS | Performed by: FAMILY MEDICINE

## 2023-01-18 PROCEDURE — 3078F DIAST BP <80 MM HG: CPT | Performed by: FAMILY MEDICINE

## 2023-01-18 PROCEDURE — 3074F SYST BP LT 130 MM HG: CPT | Performed by: FAMILY MEDICINE

## 2023-01-18 PROCEDURE — G0008 ADMIN INFLUENZA VIRUS VAC: HCPCS | Performed by: FAMILY MEDICINE

## 2023-01-18 PROCEDURE — 1123F ACP DISCUSS/DSCN MKR DOCD: CPT | Performed by: FAMILY MEDICINE

## 2023-01-18 NOTE — PATIENT INSTRUCTIONS
Learning About Vision Tests  What are vision tests? The four most common vision tests are visual acuity tests, refraction, visual field tests, and color vision tests. Visual acuity (sharpness) tests  These tests are used: To see if you need glasses or contact lenses. To monitor an eye problem. To check an eye injury. Visual acuity tests are done as part of routine exams. You may also have this test when you get your 's license or apply for some types of jobs. Visual field tests  These tests are used: To check for vision loss in any area of your range of vision. To screen for certain eye diseases. To look for nerve damage after a stroke, head injury, or other problem that could reduce blood flow to the brain. Refraction and color tests  A refraction test is done to find the right prescription for glasses and contact lenses. A color vision test is done to check for color blindness. Color vision is often tested as part of a routine exam. You may also have this test when you apply for a job where recognizing different colors is important, such as , electronics, or the [employer]. How are vision tests done? Visual acuity test   You cover one eye at a time. You read aloud from a wall chart across the room. You read aloud from a small card that you hold in your hand. Refraction   You look into a special device. The device puts lenses of different strengths in front of each eye to see how strong your glasses or contact lenses need to be. Visual field tests   Your doctor may have you look through special machines. Or your doctor may simply have you stare straight ahead while they move a finger into and out of your field of vision. Color vision test   You look at pieces of printed test patterns in various colors. You say what number or symbol you see. Your doctor may have you trace the number or symbol using a pointer. How do these tests feel?   There is very little chance of having a problem from this test. If dilating drops are used for a vision test, they may make the eyes sting and cause a medicine taste in the mouth. Follow-up care is a key part of your treatment and safety. Be sure to make and go to all appointments, and call your doctor if you are having problems. It's also a good idea to know your test results and keep a list of the medicines you take. Where can you learn more? Go to http://www.ibrahim.com/ and enter G551 to learn more about \"Learning About Vision Tests. \"  Current as of: October 12, 2022               Content Version: 13.5  © 1272-8568 [employer]. Care instructions adapted under license by [facility] ([facility]). If you have questions about a medical condition or this instruction, always ask your healthcare professional. [address] any warranty or liability for your use of this information. Advance Directives: Care Instructions  Overview  An advance directive is a legal way to state your wishes at the end of your life. It tells your family and your doctor what to do if you can't say what you want. There are two main types of advance directives. You can change them any time your wishes change. Living will. This form tells your family and your doctor your wishes about life support and other treatment. The form is also called a declaration. Medical power of . This form lets you name a person to make treatment decisions for you when you can't speak for yourself. This person is called a health care agent (health care proxy, health care surrogate). The form is also called a durable power of  for health care. If you do not have an advance directive, decisions about your medical care may be made by a family member, or by a doctor or a  who doesn't know you. It may help to think of an advance directive as a gift to the people who care for you.  If you have one, they won't have to make tough decisions by themselves. For more information, including forms for your state, see the [address] website (www.caringinfo.org/planning/advance-directives/). Follow-up care is a key part of your treatment and safety. Be sure to make and go to all appointments, and call your doctor if you are having problems. It's also a good idea to know your test results and keep a list of the medicines you take. What should you include in an advance directive? Many states have a unique advance directive form. (It may ask you to address specific issues.) Or you might use a universal form that's approved by many states. If your form doesn't tell you what to address, it may be hard to know what to include in your advance directive. Use the questions below to help you get started. Who do you want to make decisions about your medical care if you are not able to? What life-support measures do you want if you have a serious illness that gets worse over time or can't be cured? What are you most afraid of that might happen? (Maybe you're afraid of having pain, losing your independence, or being kept alive by machines.)  Where would you prefer to die? (Your home? A hospital? A nursing home?)  Do you want to donate your organs when you die? Do you want certain Church practices performed before you die? When should you call for help? Be sure to contact your doctor if you have any questions. Where can you learn more? Go to http://www.ibrahim.com/ and enter R264 to learn more about \"Advance Directives: Care Instructions. \"  Current as of: June 16, 2022               Content Version: 13.5  © 7308-8234 [employer]. Care instructions adapted under license by [facility] ([facility]). If you have questions about a medical condition or this instruction, always ask your healthcare professional. [address] any warranty or liability for your use of this information.       Personalized Preventive Plan for Dina Garcia - 1/18/2023  Medicare offers a range of preventive health benefits. Some of the tests and screenings are paid in full while other may be subject to a deductible, co-insurance, and/or copay.    Some of these benefits include a comprehensive review of your medical history including lifestyle, illnesses that may run in your family, and various assessments and screenings as appropriate.    After reviewing your medical record and screening and assessments performed today your provider may have ordered immunizations, labs, imaging, and/or referrals for you.  A list of these orders (if applicable) as well as your Preventive Care list are included within your After Visit Summary for your review.    Other Preventive Recommendations:    A preventive eye exam performed by an eye specialist is recommended every 1-2 years to screen for glaucoma; cataracts, macular degeneration, and other eye disorders.  A preventive dental visit is recommended every 6 months.  Try to get at least 150 minutes of exercise per week or 10,000 steps per day on a pedometer .  Order or download the FREE \"Exercise & Physical Activity: Your Everyday Guide\" from [employer]. Call 1-529.153.7398 or search [employer] online.  You need 1534-8477 mg of calcium and 6292-0700 IU of vitamin D per day. It is possible to meet your calcium requirement with diet alone, but a vitamin D supplement is usually necessary to meet this goal.  When exposed to the sun, use a sunscreen that protects against both UVA and UVB radiation with an SPF of 30 or greater. Reapply every 2 to 3 hours or after sweating, drying off with a towel, or swimming.  Always wear a seat belt when traveling in a car. Always wear a helmet when riding a bicycle or motorcycle.

## 2023-01-18 NOTE — PROGRESS NOTES
Chief Complaint   Patient presents with    Medicare AWV       Medicare Annual Wellness Visit  Name: An James Date: 2023   MRN: [id number] Sex: Female   Age: 79 y.o. Ethnicity: Non- / Non    : 1955 Race: White (non-)      Loi Griggs is here for Medicare AWV     Tinea 80-year-old female here for Medicare annual wellness visit. Compliant with current medications. Still smokes 1 pack a day. Been trying her best to lose weight and been unsuccessful. Patient denies any chest pains or shortness of breath. No melena or hematochezia. 2023: Seen at [place] [address]. Vascular Surgery, Sheron Beltran CNP, for evaluation of the ascending thoracic aortic aneurysm (4.7 cm) and  post-op visit for her left varicose surgery. 2022: Seen Dr. Serenity Armstrong, had left-sided great saphenous vein ablation with radiofrequency and stub phlebectomy    2022: Bilateral carotid Doppler: Severe disease greater than 70% stenosis but less than near occlusion involving the right internal carotid artery, mild disease less than 50% stenosis involving the left internal carotid artery. Reviewed blood test done on 2022: Hemoglobin A1c 6.6%, blood type A+, sodium 139, potassium 3.8, glucose 185, calcium 8.7, CBC within limits. Reviewed blood test done on August 15, 2022: Total cholesterol 162, triglyceride 81, HDL 62, LDL 84, Hemoglobin A1c 6.4%. Screenings for behavioral, psychosocial and functional/safety risks, and cognitive dysfunction are all negative except as indicated below. These results, as well as other patient data from the [address] form, are documented in Flowsheets linked to this Encounter.     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 Current Outpatient Medications   Medication Sig Dispense Refill    rivaroxaban (XARELTO) 20 MG TABS tablet Take 1 tablet by mouth daily (with breakfast) 90 tablet 3    omeprazole (PRILOSEC) 20 MG delayed release capsule Take 1 capsule by mouth Daily 90 capsule 3    losartan (COZAAR) 50 MG tablet Take 1 tablet by mouth in the morning and 1 tablet in the evening. 180 tablet 3    atorvastatin (LIPITOR) 40 MG tablet Take 1 tablet by mouth nightly 90 tablet 3    venlafaxine (EFFEXOR) 100 MG tablet Take 1 tablet by mouth 3 times daily 270 tablet 3    lamoTRIgine (LAMICTAL) 200 MG tablet Take 1 tablet by mouth daily 90 tablet 3    furosemide (LASIX) 40 MG tablet Take 40 mg by mouth daily       ferrous sulfate 134 MG TABS Take 325 mg by mouth daily        No current facility-administered medications for this visit. Past Medical History:   Diagnosis Date    Aneurysm of thoracic aorta 03/22/2022    Increase from 4.4 cm to 4.7 cm    Anticoagulant long-term use 2017    Arthritis     knees    Carotid artery stenosis 2021    Chronic back pain 2021    COVID-19 virus infection 03/08/2021    Received convalescent plasma    Deep vein thrombosis of left femoral vein ([facility] [address] 06/10/2017    Started Xarelto    Depression 2003    with anxiety. Stable on Lamictal and Effexor    Diabetes mellitus ([address] 11/06/2019    Hemoglobin A1c 6.6%, diet controlled    GI bleed 05/20/2021    hemoglobin 6.0. Received 2 units of packed RBC.     High cholesterol     History of chickenpox     History of DVT (deep vein thrombosis) 2012    Hypertension 09/23/2020    Started lisinopril    Obesity     Peripheral vascular disease ([address] 2021    Pulmonary embolism ([facility] [address] 06/10/2017    Sleep apnea, unspecified     Sudden visual loss of left eye 06/26/2017    ESR 77, possible temporal arteritis but normal temporal artery biopsy Past Surgical History:   Procedure Laterality Date    CARDIAC CATHETERIZATION  08/10/2021    Nonobstructive CAD, mid LAD 40%, mid RCA 20 to 30% and distal RCA 50%. Recommended medical management. Performed by Dr. José Manuel Smith  04/02/2013    Done by Dr. Erin Barry recommended 10-year follow-up    COLONOSCOPY  08/09/2021    Due to GI bleed, done by Dr. Bella Castillo had polyp removed, recommended 3-year follow-up.     HYSTERECTOMY, TOTAL ABDOMINAL (CERVIX REMOVED)  2003    OTHER SURGICAL HISTORY  06/29/2017    temporal artery bx    OVARY REMOVAL Bilateral     Age 48    UPPER GASTROINTESTINAL ENDOSCOPY  08/09/2021    Esophageal ulcer with esophageal stricture done by Dr. Tonja Cleary  12/23/2022    Left great saphenous vein radiofrequency ablation with radiofrequency and stub phlebectomy by Dr. Velvet James         Family History   Problem Relation Age of Onset    Lupus Mother     Ovarian Cancer Mother     Cancer Mother     Obesity Mother     Clotting Disorder Father         hx blood clot    Obesity Sister     Obesity Sister        CareTeam (Including outside providers/suppliers regularly involved in providing care):   Patient Care Team:  Shelton Garza MD as PCP - General (Family Medicine)  Kolton Woods MD as PCP - Hematology/Oncology (Hematology and Oncology)  Shelton Garza MD as PCP - [facility]  Mala Etienne MD as Consulting Physician (Gastroenterology)  Maria Guadalupe Isidro as Consulting Physician (Podiatry)  Estrella Marie MD as Consulting Physician (Interventional Cardiology)    Wt Readings from Last 3 Encounters:   01/18/23 278 lb (126.1 kg)   06/09/22 271 lb (122.9 kg)   03/23/22 269 lb 3.2 oz (122.1 kg)     /72   Pulse 88   Ht 5' 4.8\" (1.646 m)   Wt 278 lb (126.1 kg)   SpO2 97%   BMI 46.55 kg/m²      Based upon direct observation of the patient, evaluation of cognition reveals recent and remote memory intact. Review of Systems    Physical Exam     Patient's complete Health Risk Assessment and screening values have been reviewed and are found in Flowsheets. The following problems were reviewed today and where indicated follow up appointments were made and/or referrals ordered. Positive Risk Factor Screenings with Interventions:       Depression:  PHQ-2 Score: 2  PHQ-9 Total Score: 9    Interpretation:   1-4 = minimal  5-9 = mild  10-14 = moderate  15-19 = moderately severe  20-27 = severe   Substance History:  Social History       Tobacco History       Smoking Status  Every Day Smoking Start Date  1/1/1970 Smoking Frequency  1 pack/day for 55.00 years (55.00 pk-yrs) Smoking Tobacco Type  Cigarettes since 1/1/1970      Smokeless Tobacco Use  Never              Alcohol History       Alcohol Use Status  No              Drug Use       Drug Use Status  No              Sexual Activity       Sexually Active  Not Currently                   Alcohol Screening:       A score of 8 or more is associated with harmful or hazardous drinking. A score of 13 or more in women, and 15 or more in men, is likely to indicate alcohol dependence. General Health and ACP:  General  In general, how would you say your health is?: Good  In the past 7 days, have you experienced any of the following: New or Increased Pain, New or Increased Fatigue, Loneliness, Social Isolation, Stress or Anger?: No  Do you get the social and emotional support that you need?: Yes  Do you have a Living Will?: (!) No    Advance Directives       Power of  Living Will ACP-Advance Directive ACP-Power of     Not on File Not on File Not on File Not on File        General Health Risk Interventions:  has adequate emotional and social support from family.     Weight and Activity:  Physical Activity: Insufficiently Active    Days of Exercise per Week: 2 days    Minutes of Exercise per Session: 20 min     On average, how many days per week do you engage in moderate to strenuous exercise (like a brisk walk)?: 2 days  Have you lost any weight without trying in the past 3 months?: No  Body mass index: (!) 46.54  Health Habits/Nutrition Interventions:  has not exercising and she needs to watch her diet. Encouraged to quit smoking again. Vision Screen:  Do you have difficulty driving, watching TV, or doing any of your daily activities because of your eyesight?: No  Have you had an eye exam within the past year?: (!) No  No results found.     Safety:  Do you have either shower bars, grab bars, non-slip mats or non-slip surfaces in your shower or bathtub?: (!) No  Safety Interventions:  Home safety tips provided       Personalized Preventive Plan   Current Health Maintenance Status  Immunization History   Administered Date(s) Administered    COVID-19, PFIZER PURPLE top, DILUTE for use, (age 15 y+), 30mcg/0.3mL 05/29/2021, 06/14/2021, 01/04/2022    Influenza Virus Vaccine 10/08/2015, 11/01/2018, 11/06/2019, 09/23/2020    Influenza, FLUAD, (age 72 y+), Adjuvanted, 0.5mL 10/27/2021, 01/18/2023    Influenza, FLUCELVAX, (age 10 mo+), MDCK, PF, 0.5mL 10/11/2017    Pneumococcal Polysaccharide ([id number]) 12/03/2020    Tdap (Boostrix, Adacel) 12/14/2021        Health Maintenance   Topic Date Due    Diabetic retinal exam  Never done    Shingles vaccine (1 of 2) Never done    Low dose CT lung screening  Never done    COVID-19 Vaccine (4 - Booster) 03/01/2022    Diabetic Alb to Cr ratio (uACR) test  05/07/2022    Breast cancer screen  12/21/2022    Diabetic foot exam  06/09/2023    Lipids  08/15/2023    GFR test (Diabetes, CKD 3-4, OR last GFR 15-59)  08/16/2023    A1C test (Diabetic or Prediabetic)  12/23/2023    Depression Monitoring  01/16/2024    Annual Wellness Visit (AWV)  01/19/2024    Colorectal Cancer Screen  08/09/2024    DTaP/Tdap/Td vaccine (2 - Td or Tdap) 12/14/2031    DEXA (modify frequency per FRAX score)  Completed    Flu vaccine  Completed Pneumococcal 65+ years Vaccine  Completed    Hepatitis C screen  Completed    Hepatitis A vaccine  Aged Out    Hib vaccine  Aged Out    Meningococcal (ACWY) vaccine  Aged Out     Recommendations for MEDEM Due: see orders and patient instructions/AVS.    1. Medicare annual wellness visit, subsequent  -Informed her that she is due for mammogram, update her COVID vaccination, shingles vaccine. Patient is getting chest CT due to thoracic aneurysm surveillance and so far there were no lung nodules seen. 2. Essential hypertension, benign  Blood pressure is stable at 122/72, goal is to keep it under 140/90. Continue losartan 50 mg twice a day. Watch salt intake. 3. Mixed hyperlipidemia  On atorvastatin 40 mg daily. Last LDL was 84 and the goal is less than 70 due to diabetes, hypertension and carotid stenosis. Check level to see if dose adjustment is needed. - Lipid Panel; Future  - Hepatic Function Panel; Future    4. Diet-controlled diabetes mellitus ([facility] [address]  So far has been under control with diet, last hemoglobin A1c was 6.6%. Goal is to keep under 7%. Encouraged to do better with her diet and exercise in order to lose weight and keep it off. Reminded of yearly eye examination and regular foot care. - Microalbumin / Creatinine Urine Ratio    5. Need for influenza vaccination  VIS given. No history of immunization reaction.  - Influenza, FLUAD, (age 72 y+), IM, Preservative Free, 0.5 mL    6. BMI 45.0-49.9, adult [facility])  Patient needs help to lose weight. Will refer to [address] [place] [address] weight loss program.  - Amb Referral to Nutrition Services    7. Chronic deep vein thrombosis (DVT) of femoral vein of left lower extremity (HCC)  Continue Xarelto for anticoagulation. Refilled as requested. - rivaroxaban (XARELTO) 20 MG TABS tablet; Take 1 tablet by mouth daily (with breakfast)  Dispense: 90 tablet; Refill: 3    8. GERD  Continue Omeprazole 20 mg daily    9.   Depression/anxiety  PHQ 9 score 9, continue Lamictal 200 mg daily and Effexor 100 mg 3 times a day. Work on diet and exercise and weight loss and this will help her emotionally. 10. Thoracic aortic aneurysm without rupture (HCC)/Carotid Stenosis  Continue to follow-up with Dr. August Monae. Current size is 4.7 cm. On 03/22/2022. Return in about 6 months (around 7/18/2023) for HTN/DM/cholesterol. Recommended screening schedule for the next 5-10 years is provided to the patient in written form: see Patient Instructions/AVS.    Electronically signed by Joe Garay MD on 1/18/2023 at 10:47 AM.    This dictation was generated by voice recognition computer software. Although all attempts are made to edit the dictation for accuracy, there may be errors in the transcription that are not intended.

## 2023-06-21 DIAGNOSIS — F41.8 DEPRESSION WITH ANXIETY: ICD-10-CM

## 2023-06-21 RX ORDER — VENLAFAXINE 100 MG/1
100 TABLET ORAL 3 TIMES DAILY
Qty: 270 TABLET | Refills: 3 | Status: SHIPPED | OUTPATIENT
Start: 2023-06-21

## 2023-06-21 RX ORDER — LAMOTRIGINE 200 MG/1
200 TABLET ORAL DAILY
Qty: 90 TABLET | Refills: 3 | Status: SHIPPED | OUTPATIENT
Start: 2023-06-21

## 2023-06-21 NOTE — TELEPHONE ENCOUNTER
Recent Visits  Date Type Provider Dept   01/18/23 Office Visit Skylar Abernathy MD [facility]   06/09/22 Office Visit Skylar Abernathy MD [facility]   03/23/22 Office Visit Skylar Abernathy MD [facility]   02/09/22 Office Visit Skylar Abernathy MD [facility] [address] recent visits within past 540 days with a meds authorizing provider and meeting all other requirements  Future Appointments  Date Type Provider Dept   08/15/23 Appointment Di MD Josemanuel [facility]   Showing future appointments within next 150 days with a meds authorizing provider and meeting all other requirements

## 2023-06-21 NOTE — TELEPHONE ENCOUNTER
Refill on lamoTRIgine (LAMICTAL) 200 MG tablet  venlafaxine (EFFEXOR) 100 MG tablet  [facility] [address] [place] [address], [address] [facility]

## 2023-08-15 ENCOUNTER — OFFICE VISIT (OUTPATIENT)
Dept: PRIMARY CARE CLINIC | Age: 68
End: 2023-08-15
Payer: MEDICARE

## 2023-08-15 VITALS
OXYGEN SATURATION: 98 % | BODY MASS INDEX: 44.87 KG/M2 | SYSTOLIC BLOOD PRESSURE: 120 MMHG | HEART RATE: 66 BPM | WEIGHT: 268 LBS | DIASTOLIC BLOOD PRESSURE: 78 MMHG

## 2023-08-15 DIAGNOSIS — R35.0 URINARY FREQUENCY: ICD-10-CM

## 2023-08-15 DIAGNOSIS — I10 ESSENTIAL HYPERTENSION, BENIGN: Primary | ICD-10-CM

## 2023-08-15 DIAGNOSIS — E11.9 DIET-CONTROLLED DIABETES MELLITUS (HCC): ICD-10-CM

## 2023-08-15 DIAGNOSIS — E78.2 MIXED HYPERLIPIDEMIA: ICD-10-CM

## 2023-08-15 DIAGNOSIS — R06.02 SOB (SHORTNESS OF BREATH): ICD-10-CM

## 2023-08-15 LAB
BILIRUBIN, POC: NORMAL
BLOOD URINE, POC: NEGATIVE
CLARITY, POC: CLEAR
COLOR, POC: YELLOW
GLUCOSE URINE, POC: NEGATIVE
KETONES, POC: NEGATIVE
LEUKOCYTE EST, POC: NEGATIVE
NITRITE, POC: NEGATIVE
PH, POC: 6
PROTEIN, POC: 30
SPECIFIC GRAVITY, POC: 1.02
UROBILINOGEN, POC: 0.2

## 2023-08-15 PROCEDURE — 3074F SYST BP LT 130 MM HG: CPT | Performed by: FAMILY MEDICINE

## 2023-08-15 PROCEDURE — 81002 URINALYSIS NONAUTO W/O SCOPE: CPT | Performed by: FAMILY MEDICINE

## 2023-08-15 PROCEDURE — 99214 OFFICE O/P EST MOD 30 MIN: CPT | Performed by: FAMILY MEDICINE

## 2023-08-15 PROCEDURE — 3078F DIAST BP <80 MM HG: CPT | Performed by: FAMILY MEDICINE

## 2023-08-15 PROCEDURE — 1123F ACP DISCUSS/DSCN MKR DOCD: CPT | Performed by: FAMILY MEDICINE

## 2023-08-15 RX ORDER — ALBUTEROL SULFATE 90 UG/1
2 AEROSOL, METERED RESPIRATORY (INHALATION) 4 TIMES DAILY PRN
Qty: 54 G | Refills: 1 | Status: SHIPPED | OUTPATIENT
Start: 2023-08-15

## 2023-08-15 RX ORDER — GUAIFENESIN 600 MG/1
600 TABLET, EXTENDED RELEASE ORAL 2 TIMES DAILY
Qty: 20 TABLET | Refills: 0 | Status: SHIPPED | OUTPATIENT
Start: 2023-08-15 | End: 2023-08-25

## 2023-08-15 ASSESSMENT — PATIENT HEALTH QUESTIONNAIRE - PHQ9
SUM OF ALL RESPONSES TO PHQ QUESTIONS 1-9: 3
1. LITTLE INTEREST OR PLEASURE IN DOING THINGS: 1
6. FEELING BAD ABOUT YOURSELF - OR THAT YOU ARE A FAILURE OR HAVE LET YOURSELF OR YOUR FAMILY DOWN: 0
SUM OF ALL RESPONSES TO PHQ QUESTIONS 1-9: 3
2. FEELING DOWN, DEPRESSED OR HOPELESS: 0
SUM OF ALL RESPONSES TO PHQ QUESTIONS 1-9: 3
7. TROUBLE CONCENTRATING ON THINGS, SUCH AS READING THE NEWSPAPER OR WATCHING TELEVISION: 0
SUM OF ALL RESPONSES TO PHQ9 QUESTIONS 1 & 2: 1
10. IF YOU CHECKED OFF ANY PROBLEMS, HOW DIFFICULT HAVE THESE PROBLEMS MADE IT FOR YOU TO DO YOUR WORK, TAKE CARE OF THINGS AT HOME, OR GET ALONG WITH OTHER PEOPLE: 0
8. MOVING OR SPEAKING SO SLOWLY THAT OTHER PEOPLE COULD HAVE NOTICED. OR THE OPPOSITE, BEING SO FIGETY OR RESTLESS THAT YOU HAVE BEEN MOVING AROUND A LOT MORE THAN USUAL: 0
5. POOR APPETITE OR OVEREATING: 0
SUM OF ALL RESPONSES TO PHQ QUESTIONS 1-9: 3
3. TROUBLE FALLING OR STAYING ASLEEP: 1
4. FEELING TIRED OR HAVING LITTLE ENERGY: 1
9. THOUGHTS THAT YOU WOULD BE BETTER OFF DEAD, OR OF HURTING YOURSELF: 0

## 2023-08-15 ASSESSMENT — ANXIETY QUESTIONNAIRES
5. BEING SO RESTLESS THAT IT IS HARD TO SIT STILL: 1
6. BECOMING EASILY ANNOYED OR IRRITABLE: 1
3. WORRYING TOO MUCH ABOUT DIFFERENT THINGS: 1
IF YOU CHECKED OFF ANY PROBLEMS ON THIS QUESTIONNAIRE, HOW DIFFICULT HAVE THESE PROBLEMS MADE IT FOR YOU TO DO YOUR WORK, TAKE CARE OF THINGS AT HOME, OR GET ALONG WITH OTHER PEOPLE: NOT DIFFICULT AT ALL
4. TROUBLE RELAXING: 0
GAD7 TOTAL SCORE: 3
1. FEELING NERVOUS, ANXIOUS, OR ON EDGE: 0
7. FEELING AFRAID AS IF SOMETHING AWFUL MIGHT HAPPEN: 0
2. NOT BEING ABLE TO STOP OR CONTROL WORRYING: 0

## 2023-08-15 NOTE — PROGRESS NOTES
Chief Complaint   Patient presents with    Diabetes    Hypertension         Subjective:   Farheen Espinal is a 79 y.o. female here for 6 month follow-up with known hypertension, hyperlipidemia, diabetes type 2 (diet-controlled), chronic DVT (on Xarelto) for anticoagulation), SABI (not on CPAP) and depression. Compliant with current medication. Noted 10 pound weight loss from last visit and she quit smoking June 1st. Cough improved but still has SOB like something hanging behind her throat    Emotionally doing better, she was in [place] spent some time with her sister. She went back working but only part-time. Hemoglobin A1c on 12/23/2022 was 6.6%. CT scan of the abdomen and pelvis on 3/2/2023: Ascending thoracic aortic aneurysm currently measuring 4.6 cm no significant change. No evidence of acute airspace disease. Has scheduled ultrasound this Wednesday and follow-up with Dr. Teo Holguin next week. Current Outpatient Medications   Medication Sig Dispense Refill    guaiFENesin (MUCINEX) 600 MG extended release tablet Take 1 tablet by mouth 2 times daily for 10 days 20 tablet 0    albuterol sulfate HFA (VENTOLIN HFA) 108 (90 Base) MCG/ACT inhaler Inhale 2 puffs into the lungs 4 times daily as needed for Wheezing 54 g 1    lamoTRIgine (LAMICTAL) 200 MG tablet Take 1 tablet by mouth daily 90 tablet 3    venlafaxine (EFFEXOR) 100 MG tablet Take 1 tablet by mouth 3 times daily 270 tablet 3    rivaroxaban (XARELTO) 20 MG TABS tablet Take 1 tablet by mouth daily (with breakfast) 90 tablet 3    omeprazole (PRILOSEC) 20 MG delayed release capsule Take 1 capsule by mouth Daily 90 capsule 3    losartan (COZAAR) 50 MG tablet Take 1 tablet by mouth in the morning and 1 tablet in the evening.  180 tablet 3    atorvastatin (LIPITOR) 40 MG tablet Take 1 tablet by mouth nightly 90 tablet 3    furosemide (LASIX) 40 MG tablet Take 1 tablet by mouth daily      ferrous sulfate 134 MG TABS Take 325 mg by mouth daily        No

## 2023-08-23 ENCOUNTER — HOSPITAL ENCOUNTER (OUTPATIENT)
Age: 68
Discharge: HOME | End: 2023-08-23
Payer: MEDICARE

## 2023-08-23 DIAGNOSIS — E11.9 DIET-CONTROLLED DIABETES MELLITUS (HCC): ICD-10-CM

## 2023-08-23 DIAGNOSIS — E78.2 MIXED HYPERLIPIDEMIA: ICD-10-CM

## 2023-08-23 DIAGNOSIS — I10 ESSENTIAL HYPERTENSION, BENIGN: ICD-10-CM

## 2023-08-23 LAB
ANION GAP SERPL CALCULATED.3IONS-SCNC: 13 MMOL/L (ref 3–16)
BUN SERPL-MCNC: 18 MG/DL (ref 7–20)
CALCIUM SERPL-MCNC: 8.9 MG/DL (ref 8.3–10.6)
CHLORIDE SERPL-SCNC: 107 MMOL/L (ref 99–110)
CHOLEST SERPL-MCNC: 148 MG/DL (ref 0–199)
CO2 SERPL-SCNC: 24 MMOL/L (ref 21–32)
CREAT SERPL-MCNC: 1 MG/DL (ref 0.6–1.2)
GFR SERPLBLD CREATININE-BSD FMLA CKD-EPI: >60 ML/MIN/{1.73_M2}
GLUCOSE SERPL-MCNC: 147 MG/DL (ref 70–99)
HDLC SERPL-MCNC: 58 MG/DL (ref 40–60)
LDLC SERPL CALC-MCNC: 71 MG/DL
POTASSIUM SERPL-SCNC: 4.4 MMOL/L (ref 3.5–5.1)
SODIUM SERPL-SCNC: 144 MMOL/L (ref 136–145)
TRIGL SERPL-MCNC: 94 MG/DL (ref 0–150)
VLDLC SERPL CALC-MCNC: 19 MG/DL

## 2023-08-23 PROCEDURE — 83036 HEMOGLOBIN GLYCOSYLATED A1C: CPT

## 2023-08-23 PROCEDURE — 80061 LIPID PANEL: CPT

## 2023-08-23 PROCEDURE — 36415 COLL VENOUS BLD VENIPUNCTURE: CPT

## 2023-08-23 PROCEDURE — 80048 BASIC METABOLIC PNL TOTAL CA: CPT

## 2023-08-24 LAB
EST. AVERAGE GLUCOSE BLD GHB EST-MCNC: 128.4 MG/DL
HBA1C MFR BLD: 6.1 %

## 2023-09-22 ENCOUNTER — TELEPHONE (OUTPATIENT)
Dept: PRIMARY CARE CLINIC | Age: 68
End: 2023-09-22

## 2023-09-22 DIAGNOSIS — R21 RASH AND NONSPECIFIC SKIN ERUPTION: Primary | ICD-10-CM

## 2023-09-22 RX ORDER — CLOTRIMAZOLE AND BETAMETHASONE DIPROPIONATE 10; .64 MG/G; MG/G
CREAM TOPICAL
Qty: 15 G | Refills: 0 | Status: SHIPPED | OUTPATIENT
Start: 2023-09-22

## 2023-09-22 NOTE — TELEPHONE ENCOUNTER
LVM to inform pt cream and ointment will be sent to [facility]    Per Dr Melecio Santos try thes medications for 1 week. If not better, pt to schedule an appt in office. Scripts sent to Dr Melecio Santos for review.

## 2023-09-22 NOTE — TELEPHONE ENCOUNTER
Nehemiah Fonseca, patient is having a discharge out her belly bottom, couldn't tell the color but this morning it started to look a bit pink, please call patient & advise.

## 2023-10-11 DIAGNOSIS — K21.9 GASTROESOPHAGEAL REFLUX DISEASE WITHOUT ESOPHAGITIS: ICD-10-CM

## 2023-10-11 DIAGNOSIS — I10 ESSENTIAL HYPERTENSION, BENIGN: ICD-10-CM

## 2023-10-11 DIAGNOSIS — E78.2 MIXED HYPERLIPIDEMIA: ICD-10-CM

## 2023-10-11 RX ORDER — OMEPRAZOLE 20 MG/1
20 CAPSULE, DELAYED RELEASE ORAL DAILY
Qty: 90 CAPSULE | Refills: 3 | Status: SHIPPED | OUTPATIENT
Start: 2023-10-11

## 2023-10-11 RX ORDER — ATORVASTATIN CALCIUM 40 MG/1
40 TABLET, FILM COATED ORAL
Qty: 90 TABLET | Refills: 3 | Status: SHIPPED | OUTPATIENT
Start: 2023-10-11

## 2023-10-11 RX ORDER — LOSARTAN POTASSIUM 50 MG/1
TABLET ORAL
Qty: 180 TABLET | Refills: 3 | Status: SHIPPED | OUTPATIENT
Start: 2023-10-11

## 2024-01-10 DIAGNOSIS — I82.512 CHRONIC DEEP VEIN THROMBOSIS (DVT) OF FEMORAL VEIN OF LEFT LOWER EXTREMITY (HCC): ICD-10-CM

## 2024-01-10 RX ORDER — RIVAROXABAN 20 MG/1
20 TABLET, FILM COATED ORAL
Qty: 90 TABLET | Refills: 3 | Status: SHIPPED | OUTPATIENT
Start: 2024-01-10

## 2024-01-10 NOTE — TELEPHONE ENCOUNTER
LastVisit 8/15/2023   LastLabs 08/23/2023   NextVisit 2/21/2024   LastRefilled 01/18/2023  Pharmacy:    [facility] - [place], [place] - [address] [place] [address] - P [phone] - F [phone] [facility] [address]  Phone: [phone] Fax: [phone]     pharmacy confirmed in EPIC

## 2024-01-13 ENCOUNTER — COMMUNITY OUTREACH (OUTPATIENT)
Dept: PRIMARY CARE CLINIC | Age: 69
End: 2024-01-13

## 2024-02-18 SDOH — HEALTH STABILITY: PHYSICAL HEALTH: ON AVERAGE, HOW MANY MINUTES DO YOU ENGAGE IN EXERCISE AT THIS LEVEL?: 20 MIN

## 2024-02-18 SDOH — ECONOMIC STABILITY: FOOD INSECURITY: WITHIN THE PAST 12 MONTHS, YOU WORRIED THAT YOUR FOOD WOULD RUN OUT BEFORE YOU GOT MONEY TO BUY MORE.: NEVER TRUE

## 2024-02-18 SDOH — ECONOMIC STABILITY: FOOD INSECURITY: WITHIN THE PAST 12 MONTHS, THE FOOD YOU BOUGHT JUST DIDN'T LAST AND YOU DIDN'T HAVE MONEY TO GET MORE.: NEVER TRUE

## 2024-02-18 SDOH — HEALTH STABILITY: PHYSICAL HEALTH: ON AVERAGE, HOW MANY DAYS PER WEEK DO YOU ENGAGE IN MODERATE TO STRENUOUS EXERCISE (LIKE A BRISK WALK)?: 2 DAYS

## 2024-02-18 SDOH — ECONOMIC STABILITY: HOUSING INSECURITY
IN THE LAST 12 MONTHS, WAS THERE A TIME WHEN YOU DID NOT HAVE A STEADY PLACE TO SLEEP OR SLEPT IN A SHELTER (INCLUDING NOW)?: NO

## 2024-02-18 SDOH — ECONOMIC STABILITY: TRANSPORTATION INSECURITY
IN THE PAST 12 MONTHS, HAS LACK OF TRANSPORTATION KEPT YOU FROM MEETINGS, WORK, OR FROM GETTING THINGS NEEDED FOR DAILY LIVING?: NO

## 2024-02-18 SDOH — ECONOMIC STABILITY: INCOME INSECURITY: HOW HARD IS IT FOR YOU TO PAY FOR THE VERY BASICS LIKE FOOD, HOUSING, MEDICAL CARE, AND HEATING?: NOT HARD AT ALL

## 2024-02-18 ASSESSMENT — LIFESTYLE VARIABLES
HOW OFTEN DO YOU HAVE A DRINK CONTAINING ALCOHOL: NEVER
HOW MANY STANDARD DRINKS CONTAINING ALCOHOL DO YOU HAVE ON A TYPICAL DAY: PATIENT DOES NOT DRINK

## 2024-02-18 ASSESSMENT — PATIENT HEALTH QUESTIONNAIRE - PHQ9
1. LITTLE INTEREST OR PLEASURE IN DOING THINGS: 1
2. FEELING DOWN, DEPRESSED OR HOPELESS: 1
SUM OF ALL RESPONSES TO PHQ9 QUESTIONS 1 & 2: 2
SUM OF ALL RESPONSES TO PHQ QUESTIONS 1-9: 2

## 2024-02-19 SDOH — HEALTH STABILITY: PHYSICAL HEALTH: ON AVERAGE, HOW MANY DAYS PER WEEK DO YOU ENGAGE IN MODERATE TO STRENUOUS EXERCISE (LIKE A BRISK WALK)?: 2 DAYS

## 2024-02-19 SDOH — HEALTH STABILITY: PHYSICAL HEALTH: ON AVERAGE, HOW MANY MINUTES DO YOU ENGAGE IN EXERCISE AT THIS LEVEL?: 20 MIN

## 2024-02-19 ASSESSMENT — PATIENT HEALTH QUESTIONNAIRE - PHQ9
SUM OF ALL RESPONSES TO PHQ QUESTIONS 1-9: 11
SUM OF ALL RESPONSES TO PHQ9 QUESTIONS 1 & 2: 2
10. IF YOU CHECKED OFF ANY PROBLEMS, HOW DIFFICULT HAVE THESE PROBLEMS MADE IT FOR YOU TO DO YOUR WORK, TAKE CARE OF THINGS AT HOME, OR GET ALONG WITH OTHER PEOPLE: 1
2. FEELING DOWN, DEPRESSED OR HOPELESS: 1
7. TROUBLE CONCENTRATING ON THINGS, SUCH AS READING THE NEWSPAPER OR WATCHING TELEVISION: 1
SUM OF ALL RESPONSES TO PHQ QUESTIONS 1-9: 11
9. THOUGHTS THAT YOU WOULD BE BETTER OFF DEAD, OR OF HURTING YOURSELF: 0
SUM OF ALL RESPONSES TO PHQ QUESTIONS 1-9: 11
6. FEELING BAD ABOUT YOURSELF - OR THAT YOU ARE A FAILURE OR HAVE LET YOURSELF OR YOUR FAMILY DOWN: 2
3. TROUBLE FALLING OR STAYING ASLEEP: 1
1. LITTLE INTEREST OR PLEASURE IN DOING THINGS: 1
8. MOVING OR SPEAKING SO SLOWLY THAT OTHER PEOPLE COULD HAVE NOTICED. OR THE OPPOSITE, BEING SO FIGETY OR RESTLESS THAT YOU HAVE BEEN MOVING AROUND A LOT MORE THAN USUAL: 0
SUM OF ALL RESPONSES TO PHQ QUESTIONS 1-9: 11
4. FEELING TIRED OR HAVING LITTLE ENERGY: 2
5. POOR APPETITE OR OVEREATING: 3

## 2024-02-19 ASSESSMENT — COLUMBIA-SUICIDE SEVERITY RATING SCALE - C-SSRS
7. DID THIS OCCUR IN THE LAST THREE MONTHS: NO
2. HAVE YOU ACTUALLY HAD ANY THOUGHTS OF KILLING YOURSELF?: NO
1. WITHIN THE PAST MONTH, HAVE YOU WISHED YOU WERE DEAD OR WISHED YOU COULD GO TO SLEEP AND NOT WAKE UP?: NO
6. HAVE YOU EVER DONE ANYTHING, STARTED TO DO ANYTHING, OR PREPARED TO DO ANYTHING TO END YOUR LIFE?: YES

## 2024-02-19 ASSESSMENT — LIFESTYLE VARIABLES
HOW MANY STANDARD DRINKS CONTAINING ALCOHOL DO YOU HAVE ON A TYPICAL DAY: 0
HOW OFTEN DO YOU HAVE SIX OR MORE DRINKS ON ONE OCCASION: 1
HOW OFTEN DO YOU HAVE A DRINK CONTAINING ALCOHOL: NEVER
HOW MANY STANDARD DRINKS CONTAINING ALCOHOL DO YOU HAVE ON A TYPICAL DAY: PATIENT DOES NOT DRINK
HOW OFTEN DO YOU HAVE A DRINK CONTAINING ALCOHOL: 1

## 2024-02-21 ENCOUNTER — OFFICE VISIT (OUTPATIENT)
Dept: PRIMARY CARE CLINIC | Age: 69
End: 2024-02-21
Payer: MEDICARE

## 2024-02-21 VITALS
OXYGEN SATURATION: 97 % | SYSTOLIC BLOOD PRESSURE: 148 MMHG | DIASTOLIC BLOOD PRESSURE: 80 MMHG | HEIGHT: 65 IN | HEART RATE: 72 BPM | BODY MASS INDEX: 47.32 KG/M2 | WEIGHT: 284 LBS

## 2024-02-21 DIAGNOSIS — Z23 NEED FOR PNEUMOCOCCAL VACCINATION: ICD-10-CM

## 2024-02-21 DIAGNOSIS — E11.9 DIET-CONTROLLED DIABETES MELLITUS (HCC): ICD-10-CM

## 2024-02-21 DIAGNOSIS — I10 ESSENTIAL HYPERTENSION, BENIGN: ICD-10-CM

## 2024-02-21 DIAGNOSIS — M79.642 PAIN IN BOTH HANDS: ICD-10-CM

## 2024-02-21 DIAGNOSIS — E78.2 MIXED HYPERLIPIDEMIA: ICD-10-CM

## 2024-02-21 DIAGNOSIS — F41.8 DEPRESSION WITH ANXIETY: ICD-10-CM

## 2024-02-21 DIAGNOSIS — Z87.891 PERSONAL HISTORY OF TOBACCO USE: ICD-10-CM

## 2024-02-21 DIAGNOSIS — M65.312 TRIGGER FINGER OF LEFT THUMB: ICD-10-CM

## 2024-02-21 DIAGNOSIS — Z00.00 MEDICARE ANNUAL WELLNESS VISIT, SUBSEQUENT: Primary | ICD-10-CM

## 2024-02-21 DIAGNOSIS — Z12.31 BREAST CANCER SCREENING BY MAMMOGRAM: ICD-10-CM

## 2024-02-21 DIAGNOSIS — M79.641 PAIN IN BOTH HANDS: ICD-10-CM

## 2024-02-21 LAB
CREAT UR-MCNC: 85.8 MG/DL (ref 28–259)
HBA1C MFR BLD: 7.5 %
MICROALBUMIN UR DL<=1MG/L-MCNC: <1.2 MG/DL
MICROALBUMIN/CREAT UR: NORMAL MG/G (ref 0–30)

## 2024-02-21 PROCEDURE — 3077F SYST BP >= 140 MM HG: CPT | Performed by: FAMILY MEDICINE

## 2024-02-21 PROCEDURE — G0009 ADMIN PNEUMOCOCCAL VACCINE: HCPCS | Performed by: FAMILY MEDICINE

## 2024-02-21 PROCEDURE — 3079F DIAST BP 80-89 MM HG: CPT | Performed by: FAMILY MEDICINE

## 2024-02-21 PROCEDURE — 90677 PCV20 VACCINE IM: CPT | Performed by: FAMILY MEDICINE

## 2024-02-21 PROCEDURE — 99213 OFFICE O/P EST LOW 20 MIN: CPT | Performed by: FAMILY MEDICINE

## 2024-02-21 PROCEDURE — G0439 PPPS, SUBSEQ VISIT: HCPCS | Performed by: FAMILY MEDICINE

## 2024-02-21 PROCEDURE — 83036 HEMOGLOBIN GLYCOSYLATED A1C: CPT | Performed by: FAMILY MEDICINE

## 2024-02-21 PROCEDURE — 1123F ACP DISCUSS/DSCN MKR DOCD: CPT | Performed by: FAMILY MEDICINE

## 2024-02-21 PROCEDURE — 3051F HG A1C>EQUAL 7.0%<8.0%: CPT | Performed by: FAMILY MEDICINE

## 2024-02-21 RX ORDER — DESVENLAFAXINE 100 MG/1
100 TABLET, EXTENDED RELEASE ORAL DAILY
Qty: 30 TABLET | Refills: 3 | Status: SHIPPED | OUTPATIENT
Start: 2024-02-21

## 2024-02-21 NOTE — PATIENT INSTRUCTIONS
irregular heartbeat.   After you call 911, the  may tell you to chew 1 adult-strength or 2 to 4 low-dose aspirin. Wait for an ambulance. Do not try to drive yourself.  Watch closely for changes in your health, and be sure to contact your doctor if you have any problems.  Where can you learn more?  Go to https://www.[employer].net/patientEd and enter F075 to learn more about \"A Healthy Heart: Care Instructions.\"  Current as of: June 25, 2023               Content Version: 13.9  © 3289-3135 [employer].   Care instructions adapted under license by [employer]. If you have questions about a medical condition or this instruction, always ask your healthcare professional. [employer] disclaims any warranty or liability for your use of this information.      Personalized Preventive Plan for Dina Garcia - 2/21/2024  Medicare offers a range of preventive health benefits. Some of the tests and screenings are paid in full while other may be subject to a deductible, co-insurance, and/or copay.    Some of these benefits include a comprehensive review of your medical history including lifestyle, illnesses that may run in your family, and various assessments and screenings as appropriate.    After reviewing your medical record and screening and assessments performed today your provider may have ordered immunizations, labs, imaging, and/or referrals for you.  A list of these orders (if applicable) as well as your Preventive Care list are included within your After Visit Summary for your review.    Other Preventive Recommendations:    A preventive eye exam performed by an eye specialist is recommended every 1-2 years to screen for glaucoma; cataracts, macular degeneration, and other eye disorders.  A preventive dental visit is recommended every 6 months.  Try to get at least 150 minutes of exercise per week or 10,000 steps per day on a pedometer .  Order or download the FREE \"Exercise & Physical

## 2024-02-21 NOTE — PROGRESS NOTES
Chief Complaint   Patient presents with    Medicare AWV         Medicare Annual Wellness Visit  Name: Dina Garcia Today’s Date: 2024   MRN: [id number] Sex: Female   Age: 68 y.o. Ethnicity: Non- / Non    : 1955 Race: White (non-)      Dina Garcia is here for Medicare AWV       Patient is 68-year-old female here for Medicare annual wellness visit and need due for HTN/HLD and DM f/u.  Compliant with current medications but not with diet. Still not back smoking since 2023 (52-pack year). Getting depressed and frustrated with the weight gain (16 lbs). Not been motivated to do anything. Patient denies any chest pains or shortness of breath.  No melena or hematochezia. Went back working part time. Patient noticed she gets dizzy when she look up for the past 6 months. No syncopal episode. Has right carotid stenosis and being monitored by Dr. Woods. Also c/o hand pain patricia the left thumb.       Had her eye check up thei AM with Dr. Sorto at [facility], (+) cataracts and planned surgery by April. Has another appointment next month    PHQ-9 scored 11, On Lamictal and Effexor for several years.    2023: Seen Dr. Woods, for follow-up on her PVD with claudication, varicose veins on both lower extremities and thoracic aneurysm.    Reviewed blood test done on 2023: Hemoglobin A1c 6.1%, total cholesterol 148, triglyceride 94, HDL 58, LDL 71, sodium 144, potassium 4.4, chloride 107, glucose 147, creatinine 1.0, calcium 8.9    Screenings for behavioral, psychosocial and functional/safety risks, and cognitive dysfunction are all negative except as indicated below. These results, as well as other patient data from the Health Risk Assessment form, are documented in Flowsheets linked to this Encounter.    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 
attempts are made to edit the dictation for accuracy, there may be errors in the transcription that are not intended.

## 2024-02-23 ENCOUNTER — TELEPHONE (OUTPATIENT)
Dept: PRIMARY CARE CLINIC | Age: 69
End: 2024-02-23

## 2024-02-23 NOTE — TELEPHONE ENCOUNTER
Patient was prescribed a new medication desvenlafaxine succinate (PRISTIQ) 100 MG TB24 extended release tablet   She had a question about the dosage and asked why is it only once daily.   Please advise

## 2024-02-26 ENCOUNTER — NURSE ONLY (OUTPATIENT)
Dept: PRIMARY CARE CLINIC | Age: 69
End: 2024-02-26
Payer: MEDICARE

## 2024-02-26 DIAGNOSIS — E78.2 MIXED HYPERLIPIDEMIA: ICD-10-CM

## 2024-02-26 DIAGNOSIS — Z00.00 MEDICARE ANNUAL WELLNESS VISIT, SUBSEQUENT: ICD-10-CM

## 2024-02-26 DIAGNOSIS — I10 ESSENTIAL HYPERTENSION, BENIGN: ICD-10-CM

## 2024-02-26 PROCEDURE — 36415 COLL VENOUS BLD VENIPUNCTURE: CPT | Performed by: FAMILY MEDICINE

## 2024-02-26 NOTE — TELEPHONE ENCOUNTER
Spoke with the patient and advised to stop Pristiq and go back on Effexor 100 mg 3 times a day.  Continue to monitor symptoms.

## 2024-02-26 NOTE — TELEPHONE ENCOUNTER
Pt states since starting Pristiq, she has headaches, off balance and weak. Pt informed Dr Reyes out on Monday and ok for her to respond on Tuesday.

## 2024-02-27 LAB
ALBUMIN SERPL-MCNC: 4.4 G/DL (ref 3.4–5)
ALBUMIN/GLOB SERPL: 1.8 {RATIO} (ref 1.1–2.2)
ALP SERPL-CCNC: 110 U/L (ref 40–129)
ALT SERPL-CCNC: 16 U/L (ref 10–40)
ANION GAP SERPL CALCULATED.3IONS-SCNC: 11 MMOL/L (ref 3–16)
AST SERPL-CCNC: 23 U/L (ref 15–37)
BASOPHILS # BLD: 0.1 K/UL (ref 0–0.2)
BASOPHILS NFR BLD: 1 %
BILIRUB SERPL-MCNC: 0.3 MG/DL (ref 0–1)
BUN SERPL-MCNC: 14 MG/DL (ref 7–20)
CALCIUM SERPL-MCNC: 9.3 MG/DL (ref 8.3–10.6)
CHLORIDE SERPL-SCNC: 103 MMOL/L (ref 99–110)
CHOLEST SERPL-MCNC: 161 MG/DL (ref 0–199)
CO2 SERPL-SCNC: 27 MMOL/L (ref 21–32)
CREAT SERPL-MCNC: 1.2 MG/DL (ref 0.6–1.2)
DEPRECATED RDW RBC AUTO: 16.3 % (ref 12.4–15.4)
EOSINOPHIL # BLD: 0.2 K/UL (ref 0–0.6)
EOSINOPHIL NFR BLD: 3.6 %
GFR SERPLBLD CREATININE-BSD FMLA CKD-EPI: 49 ML/MIN/{1.73_M2}
GLUCOSE SERPL-MCNC: 171 MG/DL (ref 70–99)
HCT VFR BLD AUTO: 38.8 % (ref 36–48)
HDLC SERPL-MCNC: 67 MG/DL (ref 40–60)
HGB BLD-MCNC: 13 G/DL (ref 12–16)
LDLC SERPL CALC-MCNC: 75 MG/DL
LYMPHOCYTES # BLD: 1.1 K/UL (ref 1–5.1)
LYMPHOCYTES NFR BLD: 21.3 %
MCH RBC QN AUTO: 28.5 PG (ref 26–34)
MCHC RBC AUTO-ENTMCNC: 33.5 G/DL (ref 31–36)
MCV RBC AUTO: 85.1 FL (ref 80–100)
MONOCYTES # BLD: 0.3 K/UL (ref 0–1.3)
MONOCYTES NFR BLD: 5.3 %
NEUTROPHILS # BLD: 3.7 K/UL (ref 1.7–7.7)
NEUTROPHILS NFR BLD: 68.8 %
PLATELET # BLD AUTO: 189 K/UL (ref 135–450)
PMV BLD AUTO: 9 FL (ref 5–10.5)
POTASSIUM SERPL-SCNC: 4.2 MMOL/L (ref 3.5–5.1)
PROT SERPL-MCNC: 6.9 G/DL (ref 6.4–8.2)
RBC # BLD AUTO: 4.56 M/UL (ref 4–5.2)
SODIUM SERPL-SCNC: 141 MMOL/L (ref 136–145)
TRIGL SERPL-MCNC: 95 MG/DL (ref 0–150)
VLDLC SERPL CALC-MCNC: 19 MG/DL
WBC # BLD AUTO: 5.4 K/UL (ref 4–11)

## 2024-03-20 ENCOUNTER — HOSPITAL ENCOUNTER (OUTPATIENT)
Dept: WOMENS IMAGING | Age: 69
Discharge: HOME | End: 2024-03-20
Attending: FAMILY MEDICINE
Payer: MEDICARE

## 2024-03-20 ENCOUNTER — HOSPITAL ENCOUNTER (OUTPATIENT)
Dept: CT IMAGING | Age: 69
Discharge: HOME | End: 2024-03-20
Attending: FAMILY MEDICINE
Payer: MEDICARE

## 2024-03-20 VITALS — BODY MASS INDEX: 43.32 KG/M2 | WEIGHT: 260 LBS | HEIGHT: 65 IN

## 2024-03-20 DIAGNOSIS — Z87.891 PERSONAL HISTORY OF TOBACCO USE: ICD-10-CM

## 2024-03-20 DIAGNOSIS — Z12.31 BREAST CANCER SCREENING BY MAMMOGRAM: ICD-10-CM

## 2024-03-20 PROCEDURE — 77063 BREAST TOMOSYNTHESIS BI: CPT

## 2024-03-20 PROCEDURE — 71271 CT THORAX LUNG CANCER SCR C-: CPT

## 2024-03-20 NOTE — RESULT ENCOUNTER NOTE
Mild emphysema    Borderline AAA seen at 4.8 cm will need future re-evaluation, e.g. AAA ultrasound in 6 months but will defer to Dr Reyes Steven R Lunz, MD

## 2024-03-27 ENCOUNTER — OFFICE VISIT (OUTPATIENT)
Dept: PRIMARY CARE CLINIC | Age: 69
End: 2024-03-27
Payer: COMMERCIAL

## 2024-03-27 VITALS
WEIGHT: 278 LBS | OXYGEN SATURATION: 96 % | DIASTOLIC BLOOD PRESSURE: 80 MMHG | BODY MASS INDEX: 46.26 KG/M2 | HEART RATE: 65 BPM | SYSTOLIC BLOOD PRESSURE: 160 MMHG

## 2024-03-27 DIAGNOSIS — Z01.818 PREOP EXAMINATION: Primary | ICD-10-CM

## 2024-03-27 DIAGNOSIS — H25.013 CORTICAL AGE-RELATED CATARACT OF BOTH EYES: ICD-10-CM

## 2024-03-27 PROCEDURE — 99213 OFFICE O/P EST LOW 20 MIN: CPT | Performed by: FAMILY MEDICINE

## 2024-03-27 PROCEDURE — 1123F ACP DISCUSS/DSCN MKR DOCD: CPT | Performed by: FAMILY MEDICINE

## 2024-03-27 PROCEDURE — 3078F DIAST BP <80 MM HG: CPT | Performed by: FAMILY MEDICINE

## 2024-03-27 PROCEDURE — 3077F SYST BP >= 140 MM HG: CPT | Performed by: FAMILY MEDICINE

## 2024-03-27 NOTE — PROGRESS NOTES
Chief Complaint   Patient presents with    Pre-op Exam     Pt here for a pre-op Cataract OS 4/9/24 (uHa) and OD (Ajith) 4/19/24?  [facility] , Dr WINSTON Sorto     Patient Name: Dina Garcia  YOB: 1955    This patient presents to the office today for a preoperative consultation at the request of surgeon, , who plans on performing left cataract surgery on April 4 followed by the right on 4/19/2024 at [facility]. Patient does not have any major cardiac contraindications to surgery, no recent acs, no decompensated heart failure, no uncontrolled arrythmia or major valvular heart disease.      Planned anesthesia: Local and MAC   Known anesthesia problems: None   Bleeding risk: Anticoagulant therapy- on Xarelto  Personal or FH ofDVT/PE: Yes    Patient Active Problem List   Diagnosis    Depression with anxiety    Long term current use of anticoagulant    Chronic deep vein thrombosis (DVT) of femoral vein of left lower extremity (HCC)    SABI (obstructive sleep apnea)    Mixed hyperlipidemia    Essential hypertension, benign    Thoracic aortic aneurysm without rupture (HCC)    Varicose veins of bilateral lower extremities with other complications    Chronic venous insufficiency     Past Surgical History:   Procedure Laterality Date    CARDIAC CATHETERIZATION  08/10/2021    Nonobstructive CAD, mid LAD 40%, mid RCA 20 to 30% and distal RCA 50%.  Recommended medical management.  Performed by Dr. Zelaya    CHOLECYSTECTOMY  1990    COLONOSCOPY  04/02/2013    Done by Dr. Damir Capellan recommended 10-year follow-up    COLONOSCOPY  08/09/2021    Due to GI bleed, done by Dr. Louise had polyp removed, recommended 3-year follow-up.    HYSTERECTOMY, TOTAL ABDOMINAL (CERVIX REMOVED)  2003    OTHER SURGICAL HISTORY  06/29/2017    temporal artery bx    OVARY REMOVAL Bilateral     Age 50    TUBAL LIGATION  1977    UPPER GASTROINTESTINAL ENDOSCOPY  08/09/2021    Esophageal ulcer with esophageal stricture

## 2024-06-20 DIAGNOSIS — F41.8 DEPRESSION WITH ANXIETY: ICD-10-CM

## 2024-06-20 RX ORDER — LAMOTRIGINE 200 MG/1
200 TABLET ORAL DAILY
Qty: 90 TABLET | Refills: 3 | Status: SHIPPED | OUTPATIENT
Start: 2024-06-20 | End: 2025-06-20

## 2024-06-20 NOTE — TELEPHONE ENCOUNTER
Pt calling requesting refill on Lamotrigine 200 mg please send this to [facility] -pharmacy on file.

## 2024-06-20 NOTE — TELEPHONE ENCOUNTER
Recent Visits  Date Type Provider Dept   03/27/24 Office Visit Reyes, Eleia J, MD [facility]   02/21/24 Office Visit Reyes, Eleia J, MD [facility]   08/15/23 Office Visit Reyes, Eleia J, MD [facility]   01/18/23 Office Visit Reyes, Eleia J, MD [facility]   Showing recent visits within past 540 days with a meds authorizing provider and meeting all other requirements  Future Appointments  Date Type Provider Dept   07/25/24 Appointment Reyes, Eleia J, MD [facility]   Showing future appointments within next 150 days with a meds authorizing provider and meeting all other requirements

## 2024-06-20 NOTE — TELEPHONE ENCOUNTER
Pt calling back to inform she is actually out of town in [place]. Pt prefers RX for LAMOTRIGINE 200 Mg be sent to [facility] on [address] in [place], [place]. Phone number [phone].

## 2024-07-24 DIAGNOSIS — F41.8 DEPRESSION WITH ANXIETY: ICD-10-CM

## 2024-07-24 NOTE — TELEPHONE ENCOUNTER
LastVisit 3/27/2024   LastLabs 02/26/2024   NextVisit 7/25/2024   LastRefilled 06/21/2023  Pharmacy:    [facility] - [place], [place] - [address] [place] [address] - P [phone] - F [phone]  [facility] [address]  Phone: [phone] Fax: [phone]    [id number] - [place], [place] - [address] [place] [address] - P [phone] - F [phone]  [address] [place] [address]  Phone: [phone] Fax: [phone]     pharmacy confirmed in EPIC

## 2024-07-25 ENCOUNTER — OFFICE VISIT (OUTPATIENT)
Dept: PRIMARY CARE CLINIC | Age: 69
End: 2024-07-25
Payer: COMMERCIAL

## 2024-07-25 VITALS
BODY MASS INDEX: 46.1 KG/M2 | DIASTOLIC BLOOD PRESSURE: 80 MMHG | SYSTOLIC BLOOD PRESSURE: 158 MMHG | OXYGEN SATURATION: 96 % | HEART RATE: 59 BPM | WEIGHT: 277 LBS

## 2024-07-25 DIAGNOSIS — I10 ESSENTIAL HYPERTENSION, BENIGN: ICD-10-CM

## 2024-07-25 DIAGNOSIS — E11.9 DIABETES MELLITUS TYPE II, NON INSULIN DEPENDENT (HCC): Primary | ICD-10-CM

## 2024-07-25 LAB — HBA1C MFR BLD: 7.1 %

## 2024-07-25 PROCEDURE — 3077F SYST BP >= 140 MM HG: CPT | Performed by: FAMILY MEDICINE

## 2024-07-25 PROCEDURE — 99213 OFFICE O/P EST LOW 20 MIN: CPT | Performed by: FAMILY MEDICINE

## 2024-07-25 PROCEDURE — 1123F ACP DISCUSS/DSCN MKR DOCD: CPT | Performed by: FAMILY MEDICINE

## 2024-07-25 PROCEDURE — 3051F HG A1C>EQUAL 7.0%<8.0%: CPT | Performed by: FAMILY MEDICINE

## 2024-07-25 PROCEDURE — 3079F DIAST BP 80-89 MM HG: CPT | Performed by: FAMILY MEDICINE

## 2024-07-25 PROCEDURE — 83036 HEMOGLOBIN GLYCOSYLATED A1C: CPT | Performed by: FAMILY MEDICINE

## 2024-07-25 RX ORDER — VENLAFAXINE 100 MG/1
100 TABLET ORAL 3 TIMES DAILY
Qty: 270 TABLET | Refills: 3 | Status: SHIPPED | OUTPATIENT
Start: 2024-07-25

## 2024-07-25 RX ORDER — SEMAGLUTIDE 0.68 MG/ML
0.5 INJECTION, SOLUTION SUBCUTANEOUS WEEKLY
Qty: 3 ML | Refills: 3 | Status: SHIPPED | OUTPATIENT
Start: 2024-07-25

## 2024-08-19 ENCOUNTER — TELEPHONE (OUTPATIENT)
Dept: PRIMARY CARE CLINIC | Age: 69
End: 2024-08-19

## 2024-08-19 NOTE — TELEPHONE ENCOUNTER
Patient requested for a higher dosage on Ozempic to 1.0 mg into the skin.    [facility] [address] [place], [place] [address] [facility] - P [phone]

## 2024-08-28 DIAGNOSIS — I10 ESSENTIAL HYPERTENSION, BENIGN: ICD-10-CM

## 2024-08-28 DIAGNOSIS — E78.2 MIXED HYPERLIPIDEMIA: ICD-10-CM

## 2024-08-29 RX ORDER — ATORVASTATIN CALCIUM 40 MG/1
40 TABLET, FILM COATED ORAL DAILY
Qty: 90 TABLET | Refills: 3 | Status: SHIPPED | OUTPATIENT
Start: 2024-08-29

## 2024-08-29 RX ORDER — LOSARTAN POTASSIUM 50 MG/1
50 TABLET ORAL DAILY
Qty: 180 TABLET | Refills: 3 | Status: SHIPPED | OUTPATIENT
Start: 2024-08-29

## 2024-08-29 NOTE — TELEPHONE ENCOUNTER
Recent Visits  Date Type Provider Dept   07/25/24 Office Visit Reyes, Eleia J, MD [facility]   03/27/24 Office Visit Reyes, Eleia J, MD [facility]   02/21/24 Office Visit Reyes, Eleia J, MD [facility]   08/15/23 Office Visit Reyes, Eleia J, MD [facility]   Showing recent visits within past 540 days with a meds authorizing provider and meeting all other requirements  Future Appointments  Date Type Provider Dept   10/25/24 Appointment Reyes, Eleia J, MD [facility]   Showing future appointments within next 150 days with a meds authorizing provider and meeting all other requirements

## 2024-09-16 DIAGNOSIS — F41.8 DEPRESSION WITH ANXIETY: ICD-10-CM

## 2024-09-16 RX ORDER — LAMOTRIGINE 200 MG/1
200 TABLET ORAL DAILY
Qty: 90 TABLET | Refills: 3 | Status: SHIPPED | OUTPATIENT
Start: 2024-09-16

## 2024-10-09 DIAGNOSIS — K21.9 GASTROESOPHAGEAL REFLUX DISEASE WITHOUT ESOPHAGITIS: ICD-10-CM

## 2024-10-09 NOTE — TELEPHONE ENCOUNTER
Recent Visits  Date Type Provider Dept   07/25/24 Office Visit Reyes, Eleia J, MD [facility]   03/27/24 Office Visit Reyes, Eleia J, MD [facility]   02/21/24 Office Visit Reyes, Eleia J, MD [facility]   08/15/23 Office Visit Reyes, Eleia J, MD [facility]   Showing recent visits within past 540 days with a meds authorizing provider and meeting all other requirements  Future Appointments  Date Type Provider Dept   10/25/24 Appointment Reyes, Eleia J, MD [facility]   Showing future appointments within next 150 days with a meds authorizing provider and meeting all other requirements

## 2024-10-25 ENCOUNTER — OFFICE VISIT (OUTPATIENT)
Dept: PRIMARY CARE CLINIC | Age: 69
End: 2024-10-25
Payer: COMMERCIAL

## 2024-10-25 VITALS
WEIGHT: 279 LBS | OXYGEN SATURATION: 99 % | SYSTOLIC BLOOD PRESSURE: 136 MMHG | DIASTOLIC BLOOD PRESSURE: 88 MMHG | HEART RATE: 75 BPM | BODY MASS INDEX: 46.43 KG/M2

## 2024-10-25 DIAGNOSIS — Z23 NEED FOR INFLUENZA VACCINATION: ICD-10-CM

## 2024-10-25 DIAGNOSIS — E78.2 MIXED HYPERLIPIDEMIA: ICD-10-CM

## 2024-10-25 DIAGNOSIS — E11.9 DIABETES MELLITUS TYPE II, NON INSULIN DEPENDENT (HCC): Primary | ICD-10-CM

## 2024-10-25 DIAGNOSIS — F41.8 DEPRESSION WITH ANXIETY: ICD-10-CM

## 2024-10-25 DIAGNOSIS — I10 ESSENTIAL HYPERTENSION, BENIGN: ICD-10-CM

## 2024-10-25 DIAGNOSIS — I82.512 CHRONIC DEEP VEIN THROMBOSIS (DVT) OF FEMORAL VEIN OF LEFT LOWER EXTREMITY (HCC): ICD-10-CM

## 2024-10-25 PROCEDURE — 3075F SYST BP GE 130 - 139MM HG: CPT | Performed by: FAMILY MEDICINE

## 2024-10-25 PROCEDURE — 90471 IMMUNIZATION ADMIN: CPT | Performed by: FAMILY MEDICINE

## 2024-10-25 PROCEDURE — 3051F HG A1C>EQUAL 7.0%<8.0%: CPT | Performed by: FAMILY MEDICINE

## 2024-10-25 PROCEDURE — 90653 IIV ADJUVANT VACCINE IM: CPT | Performed by: FAMILY MEDICINE

## 2024-10-25 PROCEDURE — 99214 OFFICE O/P EST MOD 30 MIN: CPT | Performed by: FAMILY MEDICINE

## 2024-10-25 PROCEDURE — 3079F DIAST BP 80-89 MM HG: CPT | Performed by: FAMILY MEDICINE

## 2024-10-25 PROCEDURE — 1123F ACP DISCUSS/DSCN MKR DOCD: CPT | Performed by: FAMILY MEDICINE

## 2024-10-25 RX ORDER — DAPAGLIFLOZIN 10 MG/1
10 TABLET, FILM COATED ORAL EVERY MORNING
Qty: 90 TABLET | Refills: 3 | Status: SHIPPED | OUTPATIENT
Start: 2024-10-25

## 2024-10-25 ASSESSMENT — PATIENT HEALTH QUESTIONNAIRE - PHQ9
SUM OF ALL RESPONSES TO PHQ QUESTIONS 1-9: 8
6. FEELING BAD ABOUT YOURSELF - OR THAT YOU ARE A FAILURE OR HAVE LET YOURSELF OR YOUR FAMILY DOWN: NOT AT ALL
SUM OF ALL RESPONSES TO PHQ9 QUESTIONS 1 & 2: 0
SUM OF ALL RESPONSES TO PHQ QUESTIONS 1-9: 8
10. IF YOU CHECKED OFF ANY PROBLEMS, HOW DIFFICULT HAVE THESE PROBLEMS MADE IT FOR YOU TO DO YOUR WORK, TAKE CARE OF THINGS AT HOME, OR GET ALONG WITH OTHER PEOPLE: NOT DIFFICULT AT ALL
1. LITTLE INTEREST OR PLEASURE IN DOING THINGS: NOT AT ALL
8. MOVING OR SPEAKING SO SLOWLY THAT OTHER PEOPLE COULD HAVE NOTICED. OR THE OPPOSITE, BEING SO FIGETY OR RESTLESS THAT YOU HAVE BEEN MOVING AROUND A LOT MORE THAN USUAL: NOT AT ALL
5. POOR APPETITE OR OVEREATING: NEARLY EVERY DAY
SUM OF ALL RESPONSES TO PHQ QUESTIONS 1-9: 8
9. THOUGHTS THAT YOU WOULD BE BETTER OFF DEAD, OR OF HURTING YOURSELF: NOT AT ALL
3. TROUBLE FALLING OR STAYING ASLEEP: MORE THAN HALF THE DAYS
2. FEELING DOWN, DEPRESSED OR HOPELESS: NOT AT ALL
4. FEELING TIRED OR HAVING LITTLE ENERGY: NEARLY EVERY DAY
SUM OF ALL RESPONSES TO PHQ QUESTIONS 1-9: 8
7. TROUBLE CONCENTRATING ON THINGS, SUCH AS READING THE NEWSPAPER OR WATCHING TELEVISION: NOT AT ALL

## 2024-10-25 ASSESSMENT — ANXIETY QUESTIONNAIRES
2. NOT BEING ABLE TO STOP OR CONTROL WORRYING: NOT AT ALL
5. BEING SO RESTLESS THAT IT IS HARD TO SIT STILL: NOT AT ALL
7. FEELING AFRAID AS IF SOMETHING AWFUL MIGHT HAPPEN: NOT AT ALL
IF YOU CHECKED OFF ANY PROBLEMS ON THIS QUESTIONNAIRE, HOW DIFFICULT HAVE THESE PROBLEMS MADE IT FOR YOU TO DO YOUR WORK, TAKE CARE OF THINGS AT HOME, OR GET ALONG WITH OTHER PEOPLE: NOT DIFFICULT AT ALL
1. FEELING NERVOUS, ANXIOUS, OR ON EDGE: NEARLY EVERY DAY
4. TROUBLE RELAXING: NOT AT ALL
3. WORRYING TOO MUCH ABOUT DIFFERENT THINGS: NOT AT ALL
6. BECOMING EASILY ANNOYED OR IRRITABLE: NOT AT ALL
GAD7 TOTAL SCORE: 3

## 2024-10-25 NOTE — PROGRESS NOTES
Chief Complaint   Patient presents with    Diabetes       Subjective:       Dina Garcia is a 68 y.o. female here for 3-month follow-up on her diabetes.  Hemoglobin A1c was 7.1%.  Recommend to try semaglutide 0.5 mg subcu weekly, unfortunately, gave severe diarrhea, tried it for 6 weeks.  Still recovering from her recent surgery.  Still smokes 15 cigarettes a day and she is working on quitting.  Denies any new concerns.    Hemoglobin A1c on 10/8/2024 at [facility] was 6.3%.    10/15/2024: Had right-sided open carotid endarterectomy with bovine pericardial patch angioplasty intraoperative EEG, performed by Dr. Woods.    Reviewed blood test done on 10/16/2024: Sodium 138, potassium 3.7, BUN 14, creatinine 0.95, glucose 150, calcium 8.8, EGFR 65, hemoglobin 10.5, hematocrit 31.9, ABO blood type A+    Current Outpatient Medications   Medication Sig Dispense Refill    dapagliflozin (FARXIGA) 10 MG tablet Take 1 tablet by mouth every morning 90 tablet 3    omeprazole (PRILOSEC) 20 MG delayed release capsule TAKE 1 CAPSULE DAILY 90 capsule 3    lamoTRIgine (LAMICTAL) 200 MG tablet TAKE 1 TABLET DAILY 90 tablet 3    losartan (COZAAR) 50 MG tablet Take 1 tablet by mouth daily 180 tablet 3    atorvastatin (LIPITOR) 40 MG tablet Take 1 tablet by mouth daily 90 tablet 3    venlafaxine (EFFEXOR) 100 MG tablet TAKE 1 TABLET THREE TIMES A  tablet 3    UNABLE TO FIND BLOOD BUILDER      XARELTO 20 MG TABS tablet TAKE 1 TABLET DAILY WITH BREAKFAST 90 tablet 3    furosemide (LASIX) 40 MG tablet Take 1 tablet by mouth daily as needed (leg edema)      ferrous sulfate 134 MG TABS Take 325 mg by mouth daily        No current facility-administered medications for this visit.      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

## 2024-10-30 LAB — DIABETIC RETINOPATHY: NEGATIVE

## 2024-12-27 ENCOUNTER — OFFICE VISIT (OUTPATIENT)
Dept: PRIMARY CARE CLINIC | Age: 69
End: 2024-12-27

## 2024-12-27 VITALS
BODY MASS INDEX: 44.93 KG/M2 | OXYGEN SATURATION: 99 % | SYSTOLIC BLOOD PRESSURE: 138 MMHG | HEART RATE: 73 BPM | DIASTOLIC BLOOD PRESSURE: 76 MMHG | WEIGHT: 270 LBS

## 2024-12-27 DIAGNOSIS — Z09 HOSPITAL DISCHARGE FOLLOW-UP: Primary | ICD-10-CM

## 2024-12-27 DIAGNOSIS — E11.9 TYPE 2 DIABETES, DIET CONTROLLED (HCC): ICD-10-CM

## 2024-12-27 DIAGNOSIS — R06.02 SOB (SHORTNESS OF BREATH): ICD-10-CM

## 2024-12-27 LAB
CHP ED QC CHECK: NORMAL
GLUCOSE BLD-MCNC: 209 MG/DL

## 2024-12-27 RX ORDER — PREDNISONE 20 MG/1
TABLET ORAL
COMMUNITY
Start: 2024-12-17

## 2024-12-27 RX ORDER — ALBUTEROL SULFATE 90 UG/1
INHALANT RESPIRATORY (INHALATION)
COMMUNITY
Start: 2024-12-17

## 2024-12-27 ASSESSMENT — PATIENT HEALTH QUESTIONNAIRE - PHQ9
9. THOUGHTS THAT YOU WOULD BE BETTER OFF DEAD, OR OF HURTING YOURSELF: NOT AT ALL
6. FEELING BAD ABOUT YOURSELF - OR THAT YOU ARE A FAILURE OR HAVE LET YOURSELF OR YOUR FAMILY DOWN: NOT AT ALL
1. LITTLE INTEREST OR PLEASURE IN DOING THINGS: NOT AT ALL
7. TROUBLE CONCENTRATING ON THINGS, SUCH AS READING THE NEWSPAPER OR WATCHING TELEVISION: NOT AT ALL
8. MOVING OR SPEAKING SO SLOWLY THAT OTHER PEOPLE COULD HAVE NOTICED. OR THE OPPOSITE, BEING SO FIGETY OR RESTLESS THAT YOU HAVE BEEN MOVING AROUND A LOT MORE THAN USUAL: NOT AT ALL
SUM OF ALL RESPONSES TO PHQ QUESTIONS 1-9: 2
5. POOR APPETITE OR OVEREATING: NOT AT ALL
2. FEELING DOWN, DEPRESSED OR HOPELESS: SEVERAL DAYS
SUM OF ALL RESPONSES TO PHQ9 QUESTIONS 1 & 2: 1
SUM OF ALL RESPONSES TO PHQ QUESTIONS 1-9: 2
3. TROUBLE FALLING OR STAYING ASLEEP: NOT AT ALL
10. IF YOU CHECKED OFF ANY PROBLEMS, HOW DIFFICULT HAVE THESE PROBLEMS MADE IT FOR YOU TO DO YOUR WORK, TAKE CARE OF THINGS AT HOME, OR GET ALONG WITH OTHER PEOPLE: NOT DIFFICULT AT ALL
SUM OF ALL RESPONSES TO PHQ QUESTIONS 1-9: 2
4. FEELING TIRED OR HAVING LITTLE ENERGY: SEVERAL DAYS
SUM OF ALL RESPONSES TO PHQ QUESTIONS 1-9: 2

## 2024-12-27 ASSESSMENT — ANXIETY QUESTIONNAIRES
2. NOT BEING ABLE TO STOP OR CONTROL WORRYING: NOT AT ALL
7. FEELING AFRAID AS IF SOMETHING AWFUL MIGHT HAPPEN: NOT AT ALL
1. FEELING NERVOUS, ANXIOUS, OR ON EDGE: NOT AT ALL
IF YOU CHECKED OFF ANY PROBLEMS ON THIS QUESTIONNAIRE, HOW DIFFICULT HAVE THESE PROBLEMS MADE IT FOR YOU TO DO YOUR WORK, TAKE CARE OF THINGS AT HOME, OR GET ALONG WITH OTHER PEOPLE: NOT DIFFICULT AT ALL
6. BECOMING EASILY ANNOYED OR IRRITABLE: NOT AT ALL
3. WORRYING TOO MUCH ABOUT DIFFERENT THINGS: NOT AT ALL
GAD7 TOTAL SCORE: 0
5. BEING SO RESTLESS THAT IT IS HARD TO SIT STILL: NOT AT ALL
4. TROUBLE RELAXING: NOT AT ALL

## 2024-12-27 NOTE — PROGRESS NOTES
attempts are made to edit the dictation for accuracy, there may be errors in the transcription that are not intended.

## 2025-01-27 ENCOUNTER — PATIENT MESSAGE (OUTPATIENT)
Dept: PRIMARY CARE CLINIC | Age: 70
End: 2025-01-27

## 2025-01-27 NOTE — TELEPHONE ENCOUNTER
Spoke with the pt. Pt states they did give her some medication. She has a f/u with Dr Reyes on 2/4/25. I did instruct her to keep that appt since medication was prescribed. Asked pt to return to the ED if sx don't get better or worse. May also schedule an appt with a covering physician if needed. Pt agrees and will see us on 2/4/25

## 2025-02-01 SDOH — ECONOMIC STABILITY: INCOME INSECURITY: IN THE LAST 12 MONTHS, WAS THERE A TIME WHEN YOU WERE NOT ABLE TO PAY THE MORTGAGE OR RENT ON TIME?: NO

## 2025-02-01 SDOH — ECONOMIC STABILITY: FOOD INSECURITY: WITHIN THE PAST 12 MONTHS, YOU WORRIED THAT YOUR FOOD WOULD RUN OUT BEFORE YOU GOT MONEY TO BUY MORE.: NEVER TRUE

## 2025-02-01 SDOH — ECONOMIC STABILITY: FOOD INSECURITY: WITHIN THE PAST 12 MONTHS, THE FOOD YOU BOUGHT JUST DIDN'T LAST AND YOU DIDN'T HAVE MONEY TO GET MORE.: NEVER TRUE

## 2025-02-01 SDOH — ECONOMIC STABILITY: TRANSPORTATION INSECURITY
IN THE PAST 12 MONTHS, HAS THE LACK OF TRANSPORTATION KEPT YOU FROM MEDICAL APPOINTMENTS OR FROM GETTING MEDICATIONS?: NO

## 2025-02-01 ASSESSMENT — PATIENT HEALTH QUESTIONNAIRE - PHQ9
9. THOUGHTS THAT YOU WOULD BE BETTER OFF DEAD, OR OF HURTING YOURSELF: NOT AT ALL
3. TROUBLE FALLING OR STAYING ASLEEP: NOT AT ALL
4. FEELING TIRED OR HAVING LITTLE ENERGY: SEVERAL DAYS
SUM OF ALL RESPONSES TO PHQ QUESTIONS 1-9: 1
SUM OF ALL RESPONSES TO PHQ QUESTIONS 1-9: 1
2. FEELING DOWN, DEPRESSED OR HOPELESS: NOT AT ALL
3. TROUBLE FALLING OR STAYING ASLEEP: NOT AT ALL
5. POOR APPETITE OR OVEREATING: NOT AT ALL
5. POOR APPETITE OR OVEREATING: NOT AT ALL
6. FEELING BAD ABOUT YOURSELF - OR THAT YOU ARE A FAILURE OR HAVE LET YOURSELF OR YOUR FAMILY DOWN: NOT AT ALL
8. MOVING OR SPEAKING SO SLOWLY THAT OTHER PEOPLE COULD HAVE NOTICED. OR THE OPPOSITE, BEING SO FIGETY OR RESTLESS THAT YOU HAVE BEEN MOVING AROUND A LOT MORE THAN USUAL: NOT AT ALL
8. MOVING OR SPEAKING SO SLOWLY THAT OTHER PEOPLE COULD HAVE NOTICED. OR THE OPPOSITE - BEING SO FIDGETY OR RESTLESS THAT YOU HAVE BEEN MOVING AROUND A LOT MORE THAN USUAL: NOT AT ALL
7. TROUBLE CONCENTRATING ON THINGS, SUCH AS READING THE NEWSPAPER OR WATCHING TELEVISION: NOT AT ALL
9. THOUGHTS THAT YOU WOULD BE BETTER OFF DEAD, OR OF HURTING YOURSELF: NOT AT ALL
7. TROUBLE CONCENTRATING ON THINGS, SUCH AS READING THE NEWSPAPER OR WATCHING TELEVISION: NOT AT ALL
6. FEELING BAD ABOUT YOURSELF - OR THAT YOU ARE A FAILURE OR HAVE LET YOURSELF OR YOUR FAMILY DOWN: NOT AT ALL
1. LITTLE INTEREST OR PLEASURE IN DOING THINGS: NOT AT ALL
1. LITTLE INTEREST OR PLEASURE IN DOING THINGS: NOT AT ALL
SUM OF ALL RESPONSES TO PHQ QUESTIONS 1-9: 1
SUM OF ALL RESPONSES TO PHQ QUESTIONS 1-9: 1
2. FEELING DOWN, DEPRESSED OR HOPELESS: NOT AT ALL
SUM OF ALL RESPONSES TO PHQ QUESTIONS 1-9: 1
10. IF YOU CHECKED OFF ANY PROBLEMS, HOW DIFFICULT HAVE THESE PROBLEMS MADE IT FOR YOU TO DO YOUR WORK, TAKE CARE OF THINGS AT HOME, OR GET ALONG WITH OTHER PEOPLE: NOT DIFFICULT AT ALL
10. IF YOU CHECKED OFF ANY PROBLEMS, HOW DIFFICULT HAVE THESE PROBLEMS MADE IT FOR YOU TO DO YOUR WORK, TAKE CARE OF THINGS AT HOME, OR GET ALONG WITH OTHER PEOPLE: NOT DIFFICULT AT ALL
SUM OF ALL RESPONSES TO PHQ9 QUESTIONS 1 & 2: 0
4. FEELING TIRED OR HAVING LITTLE ENERGY: SEVERAL DAYS

## 2025-02-04 ENCOUNTER — OFFICE VISIT (OUTPATIENT)
Dept: PRIMARY CARE CLINIC | Age: 70
End: 2025-02-04

## 2025-02-04 VITALS
HEIGHT: 65 IN | HEART RATE: 77 BPM | DIASTOLIC BLOOD PRESSURE: 80 MMHG | SYSTOLIC BLOOD PRESSURE: 160 MMHG | OXYGEN SATURATION: 99 % | BODY MASS INDEX: 45.65 KG/M2 | WEIGHT: 274 LBS

## 2025-02-04 DIAGNOSIS — Z00.00 MEDICARE ANNUAL WELLNESS VISIT, SUBSEQUENT: Primary | ICD-10-CM

## 2025-02-04 DIAGNOSIS — I82.512 CHRONIC DEEP VEIN THROMBOSIS (DVT) OF FEMORAL VEIN OF LEFT LOWER EXTREMITY (HCC): ICD-10-CM

## 2025-02-04 DIAGNOSIS — J44.1 COPD EXACERBATION (HCC): ICD-10-CM

## 2025-02-04 DIAGNOSIS — E11.36 TYPE 2 DIABETES MELLITUS WITH DIABETIC CATARACT, WITHOUT LONG-TERM CURRENT USE OF INSULIN (HCC): ICD-10-CM

## 2025-02-04 DIAGNOSIS — E11.9 TYPE 2 DIABETES, DIET CONTROLLED (HCC): ICD-10-CM

## 2025-02-04 DIAGNOSIS — E66.01 MORBID (SEVERE) OBESITY DUE TO EXCESS CALORIES: ICD-10-CM

## 2025-02-04 LAB — HBA1C MFR BLD: 6.7 %

## 2025-02-04 RX ORDER — RIVAROXABAN 10 MG/1
10 TABLET, FILM COATED ORAL
Qty: 90 TABLET | Refills: 3 | Status: SHIPPED | OUTPATIENT
Start: 2025-02-04

## 2025-02-04 RX ORDER — PREDNISONE 20 MG/1
40 TABLET ORAL DAILY
Qty: 10 TABLET | Refills: 0 | Status: SHIPPED | OUTPATIENT
Start: 2025-02-04 | End: 2025-02-09

## 2025-02-04 RX ORDER — DOXYCYCLINE HYCLATE 100 MG
100 TABLET ORAL 2 TIMES DAILY
Qty: 14 TABLET | Refills: 0 | Status: SHIPPED | OUTPATIENT
Start: 2025-02-04 | End: 2025-02-11

## 2025-02-04 SDOH — ECONOMIC STABILITY: INCOME INSECURITY: IN THE LAST 12 MONTHS, WAS THERE A TIME WHEN YOU WERE NOT ABLE TO PAY THE MORTGAGE OR RENT ON TIME?: NO

## 2025-02-04 SDOH — ECONOMIC STABILITY: FOOD INSECURITY: WITHIN THE PAST 12 MONTHS, THE FOOD YOU BOUGHT JUST DIDN'T LAST AND YOU DIDN'T HAVE MONEY TO GET MORE.: NEVER TRUE

## 2025-02-04 SDOH — ECONOMIC STABILITY: FOOD INSECURITY: WITHIN THE PAST 12 MONTHS, YOU WORRIED THAT YOUR FOOD WOULD RUN OUT BEFORE YOU GOT MONEY TO BUY MORE.: NEVER TRUE

## 2025-02-04 SDOH — HEALTH STABILITY: PHYSICAL HEALTH: ON AVERAGE, HOW MANY DAYS PER WEEK DO YOU ENGAGE IN MODERATE TO STRENUOUS EXERCISE (LIKE A BRISK WALK)?: 0 DAYS

## 2025-02-04 ASSESSMENT — PATIENT HEALTH QUESTIONNAIRE - PHQ9
1. LITTLE INTEREST OR PLEASURE IN DOING THINGS: NOT AT ALL
SUM OF ALL RESPONSES TO PHQ QUESTIONS 1-9: 0
2. FEELING DOWN, DEPRESSED OR HOPELESS: NOT AT ALL
SUM OF ALL RESPONSES TO PHQ QUESTIONS 1-9: 0
SUM OF ALL RESPONSES TO PHQ QUESTIONS 1-9: 0
SUM OF ALL RESPONSES TO PHQ9 QUESTIONS 1 & 2: 0
SUM OF ALL RESPONSES TO PHQ QUESTIONS 1-9: 0

## 2025-02-04 ASSESSMENT — LIFESTYLE VARIABLES
HOW OFTEN DO YOU HAVE A DRINK CONTAINING ALCOHOL: 1
HOW MANY STANDARD DRINKS CONTAINING ALCOHOL DO YOU HAVE ON A TYPICAL DAY: PATIENT DOES NOT DRINK
HOW OFTEN DO YOU HAVE A DRINK CONTAINING ALCOHOL: NEVER
HOW OFTEN DO YOU HAVE SIX OR MORE DRINKS ON ONE OCCASION: 1
HOW MANY STANDARD DRINKS CONTAINING ALCOHOL DO YOU HAVE ON A TYPICAL DAY: 0

## 2025-02-04 NOTE — PROGRESS NOTES
Chief Complaint   Patient presents with    Medicare AWV    Diabetes    Hypertension           Medicare Annual Wellness Visit  Name: Dina Garcia Today’s Date: 2025   MRN: [id number] Sex: Female   Age: 69 y.o. Ethnicity: Non- / Non    : 1955 Race: White (non-)      Dina Garcia is here for Medicare AWV, Diabetes, and Hypertension       Patient is 69-year-old female here for Medicare annual wellness.  Back smoking half a pack a day.  Will try harder to quit.      Screenings for behavioral, psychosocial and functional/safety risks, and cognitive dysfunction are all negative except as indicated below. These results, as well as other patient data from the Health Risk Assessment form, are documented in Flowsheets linked to this Encounter.    Allergies   Allergen Reactions    Heparin Other (See Comments)     Heparin induced thrombocytopenia confirmed at the [place], [place] 2009- Hep Associated Ab = 0.744 (ref 0.399), PIFA Hep/PF4: Positive per hospital records    Iodides Hives and Itching     Swollen eyes, pt had reaction with premedication,  NO MORE DYE PER RADIOLOGIST.     Ozempic (0.25 Or 0.5 Mg-Dose) [Semaglutide(0.25 Or 0.5mg-Dos)] Diarrhea        Current Outpatient Medications   Medication Sig Dispense Refill    doxycycline hyclate (VIBRA-TABS) 100 MG tablet Take 1 tablet by mouth 2 times daily for 7 days 14 tablet 0    predniSONE (DELTASONE) 20 MG tablet Take 2 tablets by mouth daily for 5 days 10 tablet 0    rivaroxaban (XARELTO) 10 MG TABS tablet Take 1 tablet by mouth daily (with breakfast) 30 tablet 0    rivaroxaban (XARELTO) 10 MG TABS tablet Take 1 tablet by mouth daily (with breakfast) 90 tablet 3    albuterol sulfate HFA (PROVENTIL;VENTOLIN;PROAIR) 108 (90 Base) MCG/ACT inhaler       omeprazole (PRILOSEC) 20 MG delayed release capsule TAKE 1 CAPSULE DAILY 90 capsule 3    lamoTRIgine (LAMICTAL) 200 MG tablet TAKE 1 TABLET DAILY 90 tablet 3    losartan

## 2025-02-04 NOTE — ADDENDUM NOTE
Addended by: MICHELLE TAVAREZ on: 2/4/2025 04:53 PM     Modules accepted: Orders     Statement Selected

## 2025-02-04 NOTE — PATIENT INSTRUCTIONS
and screenings as appropriate.  After reviewing your medical record and screening and assessments performed today your provider may have ordered immunizations, labs, imaging, and/or referrals for you.  A list of these orders (if applicable) as well as your Preventive Care list are included within your After Visit Summary for your review.

## 2025-02-04 NOTE — PROGRESS NOTES
Chief Complaint   Patient presents with    Medicare AWV    Diabetes    Hypertension       Subjective:   Dina Garcia is a 69 y.o. female  for Medicare annual wellness visit and also due for HTN/HLD and DM f/u.  Still smokes half a pack a day.  Still recovering from the recent hospitalization.  Patient complaining of shortness of breath especially at night and wheezing.  Wants to know if she can get another round of antibiotic.    Hospital admission on 1/26/2025 due to pneumonia.    1/29/2025: Vascular follow-up with Dr. Woods, status post right CEA with bovine pericardial patch angioplasty on October 15, 2024.    PHQ-9 scored 1, On Lamictal and Effexor for several years.    1/17/2025: Hematology follow-up with Dr. Bustillo, for her chronic anemia.  Scheduled for endoscopy to rule out GI losses.  Reduced her Xarelto from 20 to 10 mg.    Current Outpatient Medications   Medication Sig Dispense Refill    doxycycline hyclate (VIBRA-TABS) 100 MG tablet Take 1 tablet by mouth 2 times daily for 7 days 14 tablet 0    predniSONE (DELTASONE) 20 MG tablet Take 2 tablets by mouth daily for 5 days 10 tablet 0    rivaroxaban (XARELTO) 10 MG TABS tablet Take 1 tablet by mouth daily (with breakfast) 30 tablet 0    rivaroxaban (XARELTO) 10 MG TABS tablet Take 1 tablet by mouth daily (with breakfast) 90 tablet 3    albuterol sulfate HFA (PROVENTIL;VENTOLIN;PROAIR) 108 (90 Base) MCG/ACT inhaler       omeprazole (PRILOSEC) 20 MG delayed release capsule TAKE 1 CAPSULE DAILY 90 capsule 3    lamoTRIgine (LAMICTAL) 200 MG tablet TAKE 1 TABLET DAILY 90 tablet 3    losartan (COZAAR) 50 MG tablet Take 1 tablet by mouth daily 180 tablet 3    atorvastatin (LIPITOR) 40 MG tablet Take 1 tablet by mouth daily 90 tablet 3    venlafaxine (EFFEXOR) 100 MG tablet TAKE 1 TABLET THREE TIMES A  tablet 3    furosemide (LASIX) 40 MG tablet Take 1 tablet by mouth daily as needed (leg edema)      ferrous sulfate 134 MG TABS Take 325 mg by mouth

## 2025-02-24 ENCOUNTER — TELEPHONE (OUTPATIENT)
Dept: CASE MANAGEMENT | Age: 70
End: 2025-02-24

## 2025-02-24 DIAGNOSIS — Z87.891 PERSONAL HISTORY OF TOBACCO USE, PRESENTING HAZARDS TO HEALTH: Primary | ICD-10-CM

## 2025-02-24 NOTE — TELEPHONE ENCOUNTER
Hello Dr. Reyes, Eleia J, MD,    This is a friendly reminder that your patient is due for their annual lung screen on 3/20/25.  For your convenience, I have pended the order for the scan.  If you do not agree with the need for the test, please cancel the order and let me know.      Patient will be mailed reminder letter to schedule.      Thank you,     Selma Swenson  Lung Navigator  [facility]  [phone]    Future Appointments   Date Time Provider Department Center   5/7/2025  2:00 PM Reyes, Eleia J, MD [facility]       Last PCP Appt: 2/4/25     Lung Screen Criteria  Age 50-80  Current smoker or quit within the last 15 years  Has => 20 pack year history.

## 2025-04-22 ENCOUNTER — CLINICAL DOCUMENTATION (OUTPATIENT)
Dept: CASE MANAGEMENT | Age: 70
End: 2025-04-22

## 2025-04-22 NOTE — PROGRESS NOTES
Patient meets lung screening criteria. Patient due for annual CT Lung Screening. Second reminder letter mailed. If ordered, Patient may call [phone] to schedule.     Lung Screen Criteria  Age 50-80  Current smoker or quit within the last 15 years  Has => 20 pack year history.    Future Appointments   Date Time Provider Department Center   5/7/2025  2:00 PM Reyes, Eleia J, MD [facility]       Active Lung Screen order on chart: Yes

## 2025-05-14 ENCOUNTER — OFFICE VISIT (OUTPATIENT)
Dept: PRIMARY CARE CLINIC | Age: 70
End: 2025-05-14
Payer: MEDICARE

## 2025-05-14 VITALS
SYSTOLIC BLOOD PRESSURE: 130 MMHG | BODY MASS INDEX: 44.6 KG/M2 | HEART RATE: 63 BPM | OXYGEN SATURATION: 95 % | WEIGHT: 268 LBS | DIASTOLIC BLOOD PRESSURE: 78 MMHG

## 2025-05-14 DIAGNOSIS — E78.2 MIXED HYPERLIPIDEMIA: ICD-10-CM

## 2025-05-14 DIAGNOSIS — E04.1 THYROID NODULE: ICD-10-CM

## 2025-05-14 DIAGNOSIS — I10 ESSENTIAL HYPERTENSION, BENIGN: ICD-10-CM

## 2025-05-14 DIAGNOSIS — F41.8 DEPRESSION WITH ANXIETY: ICD-10-CM

## 2025-05-14 DIAGNOSIS — K21.9 GASTROESOPHAGEAL REFLUX DISEASE WITHOUT ESOPHAGITIS: ICD-10-CM

## 2025-05-14 DIAGNOSIS — E11.9 DIABETES MELLITUS TYPE II, NON INSULIN DEPENDENT (HCC): Primary | ICD-10-CM

## 2025-05-14 LAB
CHOLEST SERPL-MCNC: 171 MG/DL (ref 0–199)
CREAT UR-MCNC: 138 MG/DL (ref 28–259)
HBA1C MFR BLD: 6.9 %
HDLC SERPL-MCNC: 62 MG/DL (ref 40–60)
LDLC SERPL CALC-MCNC: 91 MG/DL
MICROALBUMIN UR DL<=1MG/L-MCNC: 3.19 MG/DL
MICROALBUMIN/CREAT UR: 23.1 MG/G (ref 0–30)
TRIGL SERPL-MCNC: 90 MG/DL (ref 0–150)
TSH SERPL DL<=0.005 MIU/L-ACNC: 1.64 UIU/ML (ref 0.27–4.2)
VLDLC SERPL CALC-MCNC: 18 MG/DL

## 2025-05-14 PROCEDURE — 83036 HEMOGLOBIN GLYCOSYLATED A1C: CPT | Performed by: FAMILY MEDICINE

## 2025-05-14 PROCEDURE — 99214 OFFICE O/P EST MOD 30 MIN: CPT | Performed by: FAMILY MEDICINE

## 2025-05-14 PROCEDURE — 36415 COLL VENOUS BLD VENIPUNCTURE: CPT | Performed by: FAMILY MEDICINE

## 2025-05-14 PROCEDURE — 3044F HG A1C LEVEL LT 7.0%: CPT | Performed by: FAMILY MEDICINE

## 2025-05-14 PROCEDURE — 1123F ACP DISCUSS/DSCN MKR DOCD: CPT | Performed by: FAMILY MEDICINE

## 2025-05-14 PROCEDURE — 1159F MED LIST DOCD IN RCRD: CPT | Performed by: FAMILY MEDICINE

## 2025-05-14 PROCEDURE — 1160F RVW MEDS BY RX/DR IN RCRD: CPT | Performed by: FAMILY MEDICINE

## 2025-05-14 PROCEDURE — 3078F DIAST BP <80 MM HG: CPT | Performed by: FAMILY MEDICINE

## 2025-05-14 PROCEDURE — 3075F SYST BP GE 130 - 139MM HG: CPT | Performed by: FAMILY MEDICINE

## 2025-05-14 RX ORDER — OMEPRAZOLE 20 MG/1
20 CAPSULE, DELAYED RELEASE ORAL DAILY
Qty: 90 CAPSULE | Refills: 3 | Status: SHIPPED | OUTPATIENT
Start: 2025-05-14

## 2025-05-14 RX ORDER — VENLAFAXINE 100 MG/1
100 TABLET ORAL 3 TIMES DAILY
Qty: 270 TABLET | Refills: 3 | Status: SHIPPED | OUTPATIENT
Start: 2025-05-14

## 2025-05-14 RX ORDER — LAMOTRIGINE 200 MG/1
200 TABLET ORAL DAILY
Qty: 90 TABLET | Refills: 3 | Status: SHIPPED | OUTPATIENT
Start: 2025-05-14

## 2025-05-14 RX ORDER — LOSARTAN POTASSIUM 50 MG/1
50 TABLET ORAL DAILY
Qty: 180 TABLET | Refills: 3 | Status: SHIPPED | OUTPATIENT
Start: 2025-05-14

## 2025-05-14 RX ORDER — ATORVASTATIN CALCIUM 40 MG/1
40 TABLET, FILM COATED ORAL DAILY
Qty: 90 TABLET | Refills: 3 | Status: SHIPPED | OUTPATIENT
Start: 2025-05-14 | End: 2025-05-15 | Stop reason: SDUPTHER

## 2025-05-14 ASSESSMENT — PATIENT HEALTH QUESTIONNAIRE - PHQ9
SUM OF ALL RESPONSES TO PHQ QUESTIONS 1-9: 7
1. LITTLE INTEREST OR PLEASURE IN DOING THINGS: NOT AT ALL
7. TROUBLE CONCENTRATING ON THINGS, SUCH AS READING THE NEWSPAPER OR WATCHING TELEVISION: NOT AT ALL
SUM OF ALL RESPONSES TO PHQ QUESTIONS 1-9: 7
SUM OF ALL RESPONSES TO PHQ QUESTIONS 1-9: 7
5. POOR APPETITE OR OVEREATING: NEARLY EVERY DAY
9. THOUGHTS THAT YOU WOULD BE BETTER OFF DEAD, OR OF HURTING YOURSELF: NOT AT ALL
2. FEELING DOWN, DEPRESSED OR HOPELESS: NOT AT ALL
3. TROUBLE FALLING OR STAYING ASLEEP: NOT AT ALL
6. FEELING BAD ABOUT YOURSELF - OR THAT YOU ARE A FAILURE OR HAVE LET YOURSELF OR YOUR FAMILY DOWN: SEVERAL DAYS
10. IF YOU CHECKED OFF ANY PROBLEMS, HOW DIFFICULT HAVE THESE PROBLEMS MADE IT FOR YOU TO DO YOUR WORK, TAKE CARE OF THINGS AT HOME, OR GET ALONG WITH OTHER PEOPLE: NOT DIFFICULT AT ALL
SUM OF ALL RESPONSES TO PHQ QUESTIONS 1-9: 7
4. FEELING TIRED OR HAVING LITTLE ENERGY: NEARLY EVERY DAY
8. MOVING OR SPEAKING SO SLOWLY THAT OTHER PEOPLE COULD HAVE NOTICED. OR THE OPPOSITE, BEING SO FIGETY OR RESTLESS THAT YOU HAVE BEEN MOVING AROUND A LOT MORE THAN USUAL: NOT AT ALL

## 2025-05-14 NOTE — PROGRESS NOTES
requested.  Check cholesterol level to see if dose adjustment needed.  Goal of LDL to keep it under 70.  - atorvastatin (LIPITOR) 40 MG tablet; Take 1 tablet by mouth daily  Dispense: 90 tablet; Refill: 3  - Lipid Panel    3. Depression with anxiety  Stable on Lamictal and Effexor, refilled as requested.  - lamoTRIgine (LAMICTAL) 200 MG tablet; Take 1 tablet by mouth daily  Dispense: 90 tablet; Refill: 3  - venlafaxine (EFFEXOR) 100 MG tablet; Take 1 tablet by mouth 3 times daily  Dispense: 270 tablet; Refill: 3    4. Essential hypertension, benign  Blood pressure stable at 130/78, continue losartan 50 mg daily and as needed Lasix for leg edema.  Watch salt intake and work on weight loss.  - losartan (COZAAR) 50 MG tablet; Take 1 tablet by mouth daily  Dispense: 180 tablet; Refill: 3    5. Gastroesophageal reflux disease without esophagitis  Refill Prilosec as requested.  Continue to follow-up with the GI/bariatric surgeon to discuss reflux surgery or bypass surgery.  - omeprazole (PRILOSEC) 20 MG delayed release capsule; Take 1 capsule by mouth daily  Dispense: 90 capsule; Refill: 3    6. Thyroid nodule  Scheduled to see endocrinologist, check thyroid function.  - TSH reflex to FT4    7.  PVD/thoracic aneurysm  Continue to follow-up with the vascular surgeon.  Continue statin.    8.  Chronic DVT/anemia  Continue to follow-up with heme-onc.  On Xarelto 10 mg daily.  Recent CBC showed no anemia.    Return in about 3 months (around 8/14/2025) for DM/HTN f/u.     Electronically signed by Eleia J Reyes, MD on 5/14/2025 at 11:01 AM      This dictation was generated by voice recognition computer software.  Although all attempts are made to edit the dictation for accuracy, there may be errors in the transcription that are not intended.

## 2025-05-15 ENCOUNTER — RESULTS FOLLOW-UP (OUTPATIENT)
Dept: PRIMARY CARE CLINIC | Age: 70
End: 2025-05-15

## 2025-05-15 DIAGNOSIS — E78.2 MIXED HYPERLIPIDEMIA: ICD-10-CM

## 2025-05-15 RX ORDER — ATORVASTATIN CALCIUM 80 MG/1
80 TABLET, FILM COATED ORAL DAILY
Qty: 90 TABLET | Refills: 3 | Status: SHIPPED | OUTPATIENT
Start: 2025-05-15

## 2025-05-21 ENCOUNTER — HOSPITAL ENCOUNTER (OUTPATIENT)
Dept: WOMENS IMAGING | Age: 70
Discharge: HOME | End: 2025-05-21
Payer: MEDICARE

## 2025-05-21 ENCOUNTER — RESULTS FOLLOW-UP (OUTPATIENT)
Dept: PRIMARY CARE CLINIC | Age: 70
End: 2025-05-21

## 2025-05-21 DIAGNOSIS — Z12.31 ENCOUNTER FOR SCREENING MAMMOGRAM FOR BREAST CANCER: ICD-10-CM

## 2025-05-21 PROCEDURE — 77063 BREAST TOMOSYNTHESIS BI: CPT

## 2025-05-27 ENCOUNTER — CLINICAL DOCUMENTATION (OUTPATIENT)
Dept: CASE MANAGEMENT | Age: 70
End: 2025-05-27

## 2025-05-27 NOTE — PROGRESS NOTES
Patient meets lung screening criteria. Patient due for annual CT Lung Screening. Final reminder letter mailed. If ordered, Patient may call [phone] to schedule.     Lung Screen Criteria  Age 50-80  Current smoker or quit within the last 15 years  Has => 20 pack year history.    No future appointments.    Active Lung Screen order on chart: Yes